# Patient Record
Sex: FEMALE | Race: WHITE | NOT HISPANIC OR LATINO | URBAN - METROPOLITAN AREA
[De-identification: names, ages, dates, MRNs, and addresses within clinical notes are randomized per-mention and may not be internally consistent; named-entity substitution may affect disease eponyms.]

---

## 2020-05-19 ENCOUNTER — TELEMEDICINE (OUTPATIENT)
Dept: GASTROENTEROLOGY | Facility: CLINIC | Age: 55
End: 2020-05-19

## 2020-05-19 DIAGNOSIS — Z12.11 ENCOUNTER FOR SCREENING FOR MALIGNANT NEOPLASM OF COLON: ICD-10-CM

## 2020-05-19 DIAGNOSIS — R79.89 LFTS ABNORMAL: Primary | ICD-10-CM

## 2020-05-19 PROCEDURE — 99203 OFFICE O/P NEW LOW 30 MIN: CPT | Performed by: INTERNAL MEDICINE

## 2020-05-19 NOTE — PROGRESS NOTES
"Video visit:  You have chosen to receive care through a telehealth visit.  Do you consent to use a video/audio connection for your medical care today? Yes    PATIENT INFORMATION  Amaya Palm       - 1965    CHIEF COMPLAINT  Chief Complaint   Patient presents with   • Elevated Hepatic Enzymes       HISTORY OF PRESENT ILLNESS  Last year was first time with LFTs. Does have family history of Smith  5'4\" and 164 puonds   Wants it to be Alcohol and was drinking heavily last Summer      Was taking essential oil supplements and stopped them at least 5 + months ago and is only takin CBD oil.    Will repeat and complete her serology w/u but with her family history of SMITH will check a fibrosure too                REVIEW OF SYSTEMS  Review of Systems      ACTIVE PROBLEMS  There are no active problems to display for this patient.        PAST MEDICAL HISTORY  History reviewed. No pertinent past medical history.      SURGICAL HISTORY  Past Surgical History:   Procedure Laterality Date   • COLONOSCOPY      OVER 10 YRS AGO   • HYSTERECTOMY     • SEPTOPLASTY           FAMILY HISTORY  Family History   Problem Relation Age of Onset   • Colon cancer Neg Hx    • Colon polyps Neg Hx          SOCIAL HISTORY  Social History     Occupational History   • Not on file   Tobacco Use   • Smoking status: Never Smoker   • Smokeless tobacco: Never Used   Substance and Sexual Activity   • Alcohol use: Yes   • Drug use: Never   • Sexual activity: Not on file         CURRENT MEDICATIONS    Current Outpatient Medications:   •  penciclovir (DENAVIR) 1 % cream, Apply  topically to the appropriate area as directed Every 2 (Two) Hours., Disp: , Rfl:   •  valACYclovir (VALTREX) 500 MG tablet, Take 500 mg by mouth 2 (Two) Times a Day., Disp: , Rfl:     ALLERGIES  Imiquimod    VITALS  There were no vitals filed for this visit.    LAST RESULTS   No results found for any previous visit.     No results found.    PHYSICAL EXAM  Debilities/Disabilities " Identified: None  Emotional Behavior: Appropriate  Physical Exam    ASSESSMENT  Diagnoses and all orders for this visit:    LFTs abnormal  -     Mitochondrial Antibodies, M2  -     Alpha - 1 - Antitrypsin Deficiency; Future  -     Immunoglobulins Quant; Future  -     GATICA Fibrosure  -     STEPHAN  -     Anti-smooth muscle antibody titer; Future    Encounter for screening for malignant neoplasm of colon          PLAN  Will call with serology but didn't discuss CRC screening this visit but no indication she has had her colonoscopy  Return in about 2 months (around 7/19/2020).    I have discussed the above plan with the patient.  They verbalize understanding and are in agreement with the plan.  They have been advised to contact the office for any questions, concerns, or changes related to their health.

## 2021-10-28 ENCOUNTER — OFFICE VISIT (OUTPATIENT)
Dept: ENT CLINIC | Age: 56
End: 2021-10-28
Payer: COMMERCIAL

## 2021-10-28 VITALS
SYSTOLIC BLOOD PRESSURE: 147 MMHG | TEMPERATURE: 96.6 F | HEIGHT: 64 IN | HEART RATE: 86 BPM | DIASTOLIC BLOOD PRESSURE: 81 MMHG | WEIGHT: 184.4 LBS | BODY MASS INDEX: 31.48 KG/M2

## 2021-10-28 DIAGNOSIS — M54.12 CERVICAL RADICULOPATHY: ICD-10-CM

## 2021-10-28 DIAGNOSIS — H60.8X3 CHRONIC ECZEMATOUS OTITIS EXTERNA OF BOTH EARS: Primary | ICD-10-CM

## 2021-10-28 DIAGNOSIS — M48.02 CERVICAL STENOSIS OF SPINE: ICD-10-CM

## 2021-10-28 PROCEDURE — 99204 OFFICE O/P NEW MOD 45 MIN: CPT | Performed by: OTOLARYNGOLOGY

## 2021-10-28 ASSESSMENT — ENCOUNTER SYMPTOMS
COUGH: 0
EYE REDNESS: 0
EYE ITCHING: 0
SINUS PRESSURE: 0
VOICE CHANGE: 0
CHOKING: 0
EYE PAIN: 0
SHORTNESS OF BREATH: 0
NAUSEA: 0
DIARRHEA: 0
FACIAL SWELLING: 0
TROUBLE SWALLOWING: 0
RHINORRHEA: 0
SINUS PAIN: 0
SORE THROAT: 0

## 2021-10-28 NOTE — LETTER
[facility]  [address]  Phone: [phone]  Fax: [phone] Cira Desir MD      October 28, 2021     Patient: Jessy Coleman   MR Number: <[id number]>   YOB: 1965   Date of Visit: 10/28/2021       Dear Dr. Ovidio Self ref. provider found: Thank you for referring Jessy Coleman to me for evaluation/treatment. Below are the relevant portions of my assessment and plan of care. Diagnosis Orders   1. Chronic eczematous otitis externa of both ears     2. Cervical radiculopathy     3. Cervical stenosis of spine           OR for ACDF approach, 2 levels. The patient has a diagnosis of cervical stenosis which has not been responsive or cannot be treated with maximal medical therapy. The patient has been advised of options including further medical therapy, surgery, or nothing. The risks and benefits of surgery were described not limited to infection, bleeding, airway and pharyngeal fistula, scars including hypertrophic and keloids, recurrence of disease  and need for further surgical management of disease. Patient expectations during and after surgery including post-operative care and pain control were described. Questions were answered and the patient agreed to proceed with surgery which will be scheduled in an appropriate time frame in line with the severity of disease. The patient was counseled at length about the risks of perez Covid-19 in the cong-operative and post-operative states including the recovery window of their procedure. The patient was made aware that perez Covid-19 after a surgical procedure may worsen their prognosis for recovering from the virus and lend to a higher morbidity and or mortality risk. The patient was given the options of postponing their procedure. All of the risks, benefits, and alternatives were discussed. The patient does wish to proceed with the procedure.     Stop cleaining ears.              If you have questions, please do not hesitate to call me. I look forward to following Kristin Galvan along with you. Sincerely,        Salud Valerio MD    CC providers:   Sariah Dixon, [address]  [place]

## 2021-10-28 NOTE — LETTER
[facility]    Surgery Schedule Request Form: 10/28/21  [address] [place], [address]      DATE OF SURGERY:     TIME OF SURGERY:              CONF #: ____________________       Patient Information:    Patient name: Yesy Plaza    YOB: 1965 Age/Sex:56 y.o./female    SS #:(Not on file)    Wt Readings from Last 1 Encounters:   No data found for [place]       Telephone Information:   Mobile [phone]     Home [phone]     Surgeon & Procedure Information:     Lead surgeon: Mary Day Co-Surgeon: yes - Dr. Mary Rocha  Phone: [phone] Fax: [phone]  PCP: No primary care provider on file. Diagnosis: cervical stenosis, cervical radiculopathy    Procedure name/CPT: Anterior cervical decompression with fixation. C4-5, C6-7, 32997 1 unit and 46697 I unit    Procedure length: Per Dr. Mary Rohca Anesthesia: General    Special Equipment: no    Patient Status: Per Dr. Mary Rocha    Primary Payor Plan:   Member ID:    Subscriber name:     [] Implement attached clinical orders for patient.       Electronically signed by Сергей Craven MD on 10/28/2021 at 2:47 PM

## 2021-10-28 NOTE — PROGRESS NOTES
Subjective:      Patient ID: Lottie Esqueda is a 61 y.o. female. HPI  Voice Disorders  CC: voice disorder  Morenita Ha is a(n) 61 y.o. female who presents with a history of C-spine surgery. Here for revision surgery and vocal fold check. No problems with voice or swallowing from first ACDF. Left sided approach. Also some right ear discomfort. No hearing problems. Cleans ears a lot. There is no problem list on file for this patient. No past surgical history on file. No family history on file. Social History     Socioeconomic History    Marital status: Not on file     Spouse name: Not on file    Number of children: Not on file    Years of education: Not on file    Highest education level: Not on file   Occupational History    Not on file   Tobacco Use    Smoking status: Not on file   Substance and Sexual Activity    Alcohol use: Not on file    Drug use: Not on file    Sexual activity: Not on file   Other Topics Concern    Not on file   Social History Narrative    Not on file     Social Determinants of Health     Financial Resource Strain:     Difficulty of Paying Living Expenses:    Food Insecurity:     Worried About Running Out of Food in the Last Year:     [address] in the Last Year:    Transportation Needs:     Lack of Transportation (Medical):      Lack of Transportation (Non-Medical):    Physical Activity:     Days of Exercise per Week:     Minutes of Exercise per Session:    Stress:     Feeling of Stress :    Social Connections:     Frequency of Communication with Friends and Family:     Frequency of Social Gatherings with Friends and Family:     Attends Muslim Services:     Active Member of Clubs or Organizations:     Attends Club or Organization Meetings:     Marital Status:    Intimate Partner Violence:     Fear of Current or Ex-Partner:     Emotionally Abused:     Physically Abused:     Sexually Abused:        DRUG/FOOD ALLERGIES: Patient has no allergy information on record. CURRENT MEDICATIONS  Prior to Admission medications    Not on File       Lab Studies:No results found for: WBC, HGB, HCT, MCV, PLT  No results found for: GLUCOSE, BUN, CREATININE, K, NA, CL, CALCIUM  No results found for: MG  No results found for: PHOS  No results found for: ALKPHOS, ALT, AST, BILITOT, ALBUMIN, PROT, LABGLOB          Review of Systems   Constitutional: Negative for activity change, appetite change, chills, fatigue and fever. HENT: Negative for congestion, ear discharge, ear pain, facial swelling, hearing loss, nosebleeds, postnasal drip, rhinorrhea, sinus pressure, sinus pain, sneezing, sore throat, tinnitus, trouble swallowing and voice change. Eyes: Negative for pain, redness, itching and visual disturbance. Respiratory: Negative for cough, choking and shortness of breath. Gastrointestinal: Negative for diarrhea and nausea. Endocrine: Negative for cold intolerance and heat intolerance. Objective:   Physical Exam  Constitutional:       General: She is not in acute distress. Appearance: She is well-developed. HENT:      Head: Not macrocephalic and not microcephalic. No Velez's sign, abrasion, contusion, right periorbital erythema, left periorbital erythema or laceration. Right Ear: Hearing, tympanic membrane, ear canal and external ear normal. No decreased hearing noted. No laceration, drainage, swelling or tenderness. No middle ear effusion. No foreign body. No mastoid tenderness. No hemotympanum. Tympanic membrane is not injected, perforated, retracted or bulging. Tympanic membrane has normal mobility. Left Ear: Hearing, tympanic membrane, ear canal and external ear normal. No decreased hearing noted. No laceration, drainage, swelling or tenderness. No middle ear effusion. No foreign body. No mastoid tenderness. No hemotympanum. Tympanic membrane is not injected, perforated, retracted or bulging. Tympanic membrane has normal mobility.       Ears: Rosas exam findings: does not lateralize. Right Rinne: AC > BC. Left Rinne: AC > BC. Nose: No mucosal edema or rhinorrhea. Mouth/Throat:      Lips: No lesions. Mouth: No oral lesions. Dentition: Normal dentition. Tongue: No lesions. Palate: No mass. Pharynx: No oropharyngeal exudate, posterior oropharyngeal erythema or uvula swelling. Tonsils: No tonsillar abscesses. Eyes:      Extraocular Movements:      Right eye: Normal extraocular motion and no nystagmus. Left eye: Normal extraocular motion and no nystagmus. Pupils:      Right eye: Pupil is reactive. Left eye: Pupil is reactive. Neck:      Thyroid: No thyroid mass or thyromegaly. Trachea: No tracheal tenderness or tracheal deviation. Cardiovascular:      Rate and Rhythm: Normal rate and regular rhythm. Pulmonary:      Breath sounds: No stridor. Musculoskeletal:      Cervical back: Normal range of motion and neck supple. No edema or erythema. No muscular tenderness. Lymphadenopathy:      Head:      Right side of head: No submental, submandibular, tonsillar, preauricular, posterior auricular or occipital adenopathy. Left side of head: No submental, submandibular, tonsillar, preauricular, posterior auricular or occipital adenopathy. Cervical: No cervical adenopathy. Right cervical: No superficial, deep or posterior cervical adenopathy. Left cervical: No superficial, deep or posterior cervical adenopathy. Skin:     General: Skin is warm and dry. Findings: No bruising, erythema or lesion. Neurological:      Mental Status: She is alert and oriented to person, place, and time. Psychiatric:         Attention and Perception: Attention normal.         Mood and Affect: Mood normal. Mood is not anxious or depressed. Speech: Speech normal.         Assessment:       Diagnosis Orders   1. Chronic eczematous otitis externa of both ears     2.  Cervical radiculopathy     3. Cervical stenosis of spine             Plan:      OR for ACDF approach, 2 levels. The patient has a diagnosis of cervical stenosis which has not been responsive or cannot be treated with maximal medical therapy. The patient has been advised of options including further medical therapy, surgery, or nothing. The risks and benefits of surgery were described not limited to infection, bleeding, airway and pharyngeal fistula, scars including hypertrophic and keloids, recurrence of disease  and need for further surgical management of disease. Patient expectations during and after surgery including post-operative care and pain control were described. Questions were answered and the patient agreed to proceed with surgery which will be scheduled in an appropriate time frame in line with the severity of disease. The patient was counseled at length about the risks of perez Covid-19 in the cong-operative and post-operative states including the recovery window of their procedure. The patient was made aware that perez Covid-19 after a surgical procedure may worsen their prognosis for recovering from the virus and lend to a higher morbidity and or mortality risk. The patient was given the options of postponing their procedure. All of the risks, benefits, and alternatives were discussed. The patient does wish to proceed with the procedure. Stop cleaining ears.                   Rc Meek MD

## 2021-12-17 RX ORDER — MULTIVIT WITH MINERALS/LUTEIN
250 TABLET ORAL DAILY
Status: ON HOLD | COMMUNITY
End: 2021-12-31 | Stop reason: HOSPADM

## 2021-12-17 RX ORDER — BETA-CAROTENE 7500 MCG
CAPSULE ORAL
Status: ON HOLD | COMMUNITY
End: 2021-12-31 | Stop reason: HOSPADM

## 2021-12-17 NOTE — PROGRESS NOTES
[facility] patients having surgery or anesthesia are required to be Covid tested OR to have been vaccinated at least 14 days prior to your procedure. It is very important to return our call to [phone] and notify the staff of your last vaccination date otherwise you will be required to complete Covid PCR test within the 5-6 days prior to surgery & quarantine. The results will need to be faxed to PreAdmission Testing at [phone]. PRIOR TO PROCEDURE DATE:        1. PLEASE FOLLOW ANY  GUIDELINES/ INSTRUCTIONS PRIOR TO YOUR PROCEDURE AS ADVISED BY YOUR SURGEON. 2. Arrange for someone to drive you home and be with you for the first 24 hours after discharge for your safety after your procedure for which you received sedation. Ensure it is someone we can share information with regarding your discharge. 3. You must contact your surgeon for instructions IF:   You are taking any blood thinners, aspirin, anti-inflammatory or vitamin E.   There is a change in your physical condition such as a cold, fever, rash, cuts, sores or any other infection, especially near your surgical site. 4. Do not drink alcohol the day before or day of your procedure. 5. A Pre-op History and Physical for surgery MUST be completed by your Physician or Urgent Care within 30 days of your procedure date. Please bring a copy with you on the day of your procedure and along with any other testing performed. THE DAY OF YOUR PROCEDURE:  1. Follow instructions for ARRIVAL TIME as DIRECTED BY YOUR SURGEON. 2. Enter the MAIN entrance from [facility] and follow the signs to the free [employer] or [employer] parking (offered free of charge 6am-5pm). 3. Enter the Main Entrance of the hospital (do not enter from the lower level of the parking garage). Upon entrance, check in with the  at the main desk on your left. while you are in surgery. 10. If you use a CPAP, please bring it with you on the day of your procedure. 11. We recommend that valuable personal  belongings such as cash, cell phones, e-tablets or jewelry, be left at home during your stay. The hospital will not be responsible for valuables that are not secured in the hospital safe. However, if your insurance requires a co-pay, you may want to bring a method of payment, i.e. Check or credit card, if you wish to pay your co-pay the day of surgery. 12. If you are to stay overnight, you may bring a bag with personal items. Please have any large items you may need brought in by your family after your arrival to your hospital room. 15. If you have a Living Will or Durable Power of , please bring a copy on the day of your procedure. 15. With your permission, one family member may accompany you while you are being prepared for surgery. Once you are ready, additional family members may join you. HOW WE KEEP YOU SAFE and WORK TO PREVENT SURGICAL SITE INFECTIONS:  1. Health care workers should always check your ID bracelet to verify your name and birth date. You will be asked many times to state your name, date of birth, and allergies. 2. Health care workers should always clean their hands with soap or alcohol gel before providing care to you. It is okay to ask anyone if they cleaned their hands before they touch you. 3. You will be actively involved in verifying the type of procedure you are having and ensuring the correct surgical site. This will be confirmed multiple times prior to your procedure. Do NOT saad your surgery site UNLESS instructed to by your surgeon. 4. Do not shave or wax for 72 hours prior to procedure near your operative site. Shaving with a razor can irritate your skin and make it easier to develop an infection.  On the day of your procedure, any hair that needs to be removed near the surgical site will be clipped by a healthcare worker using a special clippers designed to avoid skin irritation. 5. When you are in the operating room, your surgical site will be cleansed with a special soap, and in most cases, you will be given an antibiotic before the surgery begins. What to expect AFTER YOUR PROCEDURE:  1. Immediately following your procedure, your will be taken to the PACU for the first phase of your recovery. Your nurse will help you recover from any potential side effects of anesthesia, such as extreme drowsiness, changes in your vital signs or breathing patterns. Nausea, headache, muscle aches, or sore throat may also occur after anesthesia. Your nurse will help you manage these potential side effects. 2. For comfort and safety, arrange to have someone at home with you for the first 24 hours after discharge. 3. You and your family will be given written instructions about your diet, activity, dressing care, medications, and return visits. 4. Once at home, should issues with nausea, pain, or bleeding occur, or should you notice any signs of infection, you should call your surgeon. 5. Always clean your hands before and after caring for your wound. Do not let your family touch your surgery site without cleaning their hands. 6. Narcotic pain medications can cause significant constipation. You may want to add a stool softener to your postoperative medication schedule or speak to your surgeon on how best to manage this SIDE EFFECT. SPECIAL INSTRUCTIONS     Thank you for allowing us to care for you. We strive to exceed your expectations in the delivery of care and service provided to you and your family. If you need to contact the [place] [address] staff for any reason, please call us at [phone]    Instructions reviewed with patient during preadmission testing phone interview.   Chance Livingston RN.12/17/2021 .1:18 PM      ADDITIONAL EDUCATIONAL INFORMATION REVIEWED PER PHONE WITH YOU AND/OR YOUR FAMILY:  Yes Hibiclens® Bathing Instructions   No Antibacterial Soap

## 2021-12-17 NOTE — PROGRESS NOTES
Place patient label inside box (if no patient label, complete below)  Name:  :  MR#:   Mylene Pérez / PROCEDURE  1. I (we), Rodrigo Bee (Patient Name) authorize Finley Cheadle (Provider / Amarilis Driver) and/or such assistants as may be selected by him/her, to perform the following operation/procedure(s): C4-5, C5-6, C6-7 ANTERIOR CERVICAL DISCECTOMY AND FUSION, REMOVAL OF C5-6 PLATE       Note: If unable to obtain consent prior to an emergent procedure, document the emergent reason in the medical record. This procedure has been explained to my (our) satisfaction and included in the explanation was:  A) The intended benefit, nature, and extent of the procedure to be performed;  B) The significant risks involved and the probability of success;  C) Alternative procedures and methods of treatment;  D) The dangers and probable consequences of such alternatives (including no procedure or treatment); E) The expected consequences of the procedure on my future health;  F) Whether other qualified individuals would be performing important surgical tasks and/or whether  would be present to advise or support the procedure. I (we) understand that there are other risks of infection and other serious complications in the pre-operative/procedural and postoperative/procedural stages of my (our) care. I (we) have asked all of the questions which I (we) thought were important in deciding whether or not to undergo treatment or diagnosis. These questions have been answered to my (our) satisfaction. I (we) understand that no assurance can be given that the procedure will be a success, and no guarantee or warranty of success has been given to me (us).     2. It has been explained to me (us) that during the course of the operation/procedure, unforeseen conditions may be revealed that necessitate extension of the original procedure(s) or different procedure(s) than those set forth in Paragraph 1. I (we) authorize and request that the above-named physician, his/her assistants or his/her designees, perform procedures as necessary and desirable if deemed to be in my (our) best interest.     Revised 8/2/2021                                                                          Page 1 of 2         3. I acknowledge that health care personnel may be observing this procedure for the purpose of medical education or other specified purposes as may be necessary as requested and/or approved by my (our) physician. 4. I (we) consent to the disposal by the hospital Pathologist of the removed tissue, parts or organs in accordance with hospital policy. 5. I do ____ do not ____ consent to the use of a local infiltration pain blocking agent that will be used by my provider/surgical provider to help alleviate pain during my procedure. 6. I do ____ do not ____ consent to an emergent blood transfusion in the case of a life-threatening situation that requires blood components to be administered. This consent is valid for 24 hours from the beginning of the procedure. 7. This patient does ____ or does not ____ currently have a DNR status/order. If DNR order is in place, obtain Addendum to the Surgical Consent for ALL Patients with a DNR Order to address cong-operative status for limited intervention or DNR suspension.      8. I have read and fully understand the above Consent for Operation/Procedure and that all blanks were completed before I signed the consent.   _____________________________       _____________________      ____/____am/pm  Signature of Patient or legal representative      Printed Name / Relationship            Date / Time   ____________________________       _____________________      ____/____am/pm  Witness to Signature                                    Printed Name                    Date / Time     If patient is unable to sign or is a minor, complete the following)  Patient is a minor, ____ years of age, or unable to sign because:   ______________________________________________________________________________________________    Allan Layer If a phone consent is obtained, consent will be documented by using two health care professionals, each affirming that the consenting party has no questions and gives consent for the procedure discussed with the physician/provider.   _____________________          ____________________       _____/_____am/pm   2nd witness to phone consent        Printed name           Date / Time    Informed Consent:  I have provided the explanation described above in section 1 to the patient and/or legal representative.  I have provided the patient and/or legal representative with an opportunity to ask any questions about the proposed operation/procedure.   ___________________________          ____________________         ____/____am/pm  Provider / Proceduralist                            Printed name            Date / Time  Revised 8/2/2021                                                                      Page 2 of 2

## 2021-12-28 ENCOUNTER — ANESTHESIA EVENT (OUTPATIENT)
Dept: OPERATING ROOM | Age: 56
End: 2021-12-28
Payer: COMMERCIAL

## 2021-12-29 ENCOUNTER — APPOINTMENT (OUTPATIENT)
Dept: GENERAL RADIOLOGY | Age: 56
End: 2021-12-29
Attending: NEUROLOGICAL SURGERY
Payer: COMMERCIAL

## 2021-12-29 ENCOUNTER — HOSPITAL ENCOUNTER (OUTPATIENT)
Age: 56
Setting detail: OBSERVATION
Discharge: HOME | End: 2021-12-31
Attending: NEUROLOGICAL SURGERY | Admitting: NEUROLOGICAL SURGERY
Payer: COMMERCIAL

## 2021-12-29 ENCOUNTER — ANESTHESIA (OUTPATIENT)
Dept: OPERATING ROOM | Age: 56
End: 2021-12-29
Payer: COMMERCIAL

## 2021-12-29 VITALS — DIASTOLIC BLOOD PRESSURE: 63 MMHG | SYSTOLIC BLOOD PRESSURE: 124 MMHG | OXYGEN SATURATION: 96 % | TEMPERATURE: 99.3 F

## 2021-12-29 DIAGNOSIS — M48.02 CERVICAL SPINAL STENOSIS: ICD-10-CM

## 2021-12-29 DIAGNOSIS — M47.22 CERVICAL SPONDYLOSIS WITH RADICULOPATHY: Primary | ICD-10-CM

## 2021-12-29 LAB
ABO/RH: NORMAL
ANTIBODY SCREEN: NORMAL

## 2021-12-29 PROCEDURE — 7100000000 HC PACU RECOVERY - FIRST 15 MIN: Performed by: NEUROLOGICAL SURGERY

## 2021-12-29 PROCEDURE — 3600000014 HC SURGERY LEVEL 4 ADDTL 15MIN: Performed by: NEUROLOGICAL SURGERY

## 2021-12-29 PROCEDURE — 2780000010 HC IMPLANT OTHER: Performed by: NEUROLOGICAL SURGERY

## 2021-12-29 PROCEDURE — 2580000003 HC RX 258: Performed by: ANESTHESIOLOGY

## 2021-12-29 PROCEDURE — 2500000003 HC RX 250 WO HCPCS: Performed by: NURSE ANESTHETIST, CERTIFIED REGISTERED

## 2021-12-29 PROCEDURE — 3209999900 FLUORO FOR SURGICAL PROCEDURES

## 2021-12-29 PROCEDURE — 2709999900 HC NON-CHARGEABLE SUPPLY: Performed by: NEUROLOGICAL SURGERY

## 2021-12-29 PROCEDURE — 6360000002 HC RX W HCPCS: Performed by: NURSE ANESTHETIST, CERTIFIED REGISTERED

## 2021-12-29 PROCEDURE — 6370000000 HC RX 637 (ALT 250 FOR IP): Performed by: NEUROLOGICAL SURGERY

## 2021-12-29 PROCEDURE — 6360000002 HC RX W HCPCS: Performed by: NEUROLOGICAL SURGERY

## 2021-12-29 PROCEDURE — 86901 BLOOD TYPING SEROLOGIC RH(D): CPT

## 2021-12-29 PROCEDURE — 6360000002 HC RX W HCPCS: Performed by: ANESTHESIOLOGY

## 2021-12-29 PROCEDURE — 7100000001 HC PACU RECOVERY - ADDTL 15 MIN: Performed by: NEUROLOGICAL SURGERY

## 2021-12-29 PROCEDURE — 22551 ARTHRD ANT NTRBDY CERVICAL: CPT | Performed by: OTOLARYNGOLOGY

## 2021-12-29 PROCEDURE — 3700000001 HC ADD 15 MINUTES (ANESTHESIA): Performed by: NEUROLOGICAL SURGERY

## 2021-12-29 PROCEDURE — 22552 ARTHRD ANT NTRBD CERVICAL EA: CPT | Performed by: OTOLARYNGOLOGY

## 2021-12-29 PROCEDURE — 2580000003 HC RX 258: Performed by: NEUROLOGICAL SURGERY

## 2021-12-29 PROCEDURE — 3600000004 HC SURGERY LEVEL 4 BASE: Performed by: NEUROLOGICAL SURGERY

## 2021-12-29 PROCEDURE — 2720000010 HC SURG SUPPLY STERILE: Performed by: NEUROLOGICAL SURGERY

## 2021-12-29 PROCEDURE — C1729 CATH, DRAINAGE: HCPCS | Performed by: NEUROLOGICAL SURGERY

## 2021-12-29 PROCEDURE — C1713 ANCHOR/SCREW BN/BN,TIS/BN: HCPCS | Performed by: NEUROLOGICAL SURGERY

## 2021-12-29 PROCEDURE — 2580000003 HC RX 258: Performed by: NURSE ANESTHETIST, CERTIFIED REGISTERED

## 2021-12-29 PROCEDURE — G0378 HOSPITAL OBSERVATION PER HR: HCPCS

## 2021-12-29 PROCEDURE — 72040 X-RAY EXAM NECK SPINE 2-3 VW: CPT

## 2021-12-29 PROCEDURE — 86850 RBC ANTIBODY SCREEN: CPT

## 2021-12-29 PROCEDURE — 3700000000 HC ANESTHESIA ATTENDED CARE: Performed by: NEUROLOGICAL SURGERY

## 2021-12-29 PROCEDURE — 86900 BLOOD TYPING SEROLOGIC ABO: CPT

## 2021-12-29 DEVICE — IMPLANTABLE DEVICE
Type: IMPLANTABLE DEVICE | Site: SPINE CERVICAL | Status: FUNCTIONAL
Brand: TRINICA®

## 2021-12-29 DEVICE — IMPLANTABLE DEVICE
Type: IMPLANTABLE DEVICE | Site: SPINE CERVICAL | Status: FUNCTIONAL
Brand: TRINICA® TRINICA®

## 2021-12-29 DEVICE — IMPLANTABLE DEVICE
Type: IMPLANTABLE DEVICE | Site: SPINE CERVICAL | Status: FUNCTIONAL
Brand: VISTA®-S

## 2021-12-29 DEVICE — PRIMAGEN 1CC: Type: IMPLANTABLE DEVICE | Site: SPINE CERVICAL | Status: FUNCTIONAL

## 2021-12-29 RX ORDER — OXYCODONE HYDROCHLORIDE AND ACETAMINOPHEN 5; 325 MG/1; MG/1
2 TABLET ORAL PRN
Status: DISCONTINUED | OUTPATIENT
Start: 2021-12-29 | End: 2021-12-29 | Stop reason: HOSPADM

## 2021-12-29 RX ORDER — REMIFENTANIL HYDROCHLORIDE 1 MG/ML
INJECTION, POWDER, LYOPHILIZED, FOR SOLUTION INTRAVENOUS CONTINUOUS PRN
Status: DISCONTINUED | OUTPATIENT
Start: 2021-12-29 | End: 2021-12-29 | Stop reason: SDUPTHER

## 2021-12-29 RX ORDER — SUCCINYLCHOLINE/SOD CL,ISO/PF 200MG/10ML
SYRINGE (ML) INTRAVENOUS PRN
Status: DISCONTINUED | OUTPATIENT
Start: 2021-12-29 | End: 2021-12-29 | Stop reason: SDUPTHER

## 2021-12-29 RX ORDER — ONDANSETRON 2 MG/ML
4 INJECTION INTRAMUSCULAR; INTRAVENOUS EVERY 6 HOURS PRN
Status: DISCONTINUED | OUTPATIENT
Start: 2021-12-29 | End: 2021-12-31 | Stop reason: HOSPADM

## 2021-12-29 RX ORDER — OXYCODONE HYDROCHLORIDE 5 MG/1
5 TABLET ORAL EVERY 4 HOURS PRN
Status: DISCONTINUED | OUTPATIENT
Start: 2021-12-29 | End: 2021-12-31 | Stop reason: HOSPADM

## 2021-12-29 RX ORDER — POLYETHYLENE GLYCOL 3350 17 G/17G
17 POWDER, FOR SOLUTION ORAL DAILY
Status: DISCONTINUED | OUTPATIENT
Start: 2021-12-29 | End: 2021-12-31 | Stop reason: HOSPADM

## 2021-12-29 RX ORDER — ACETAMINOPHEN 325 MG/1
650 TABLET ORAL EVERY 4 HOURS PRN
Status: DISCONTINUED | OUTPATIENT
Start: 2021-12-29 | End: 2021-12-31 | Stop reason: HOSPADM

## 2021-12-29 RX ORDER — PROPOFOL 10 MG/ML
INJECTION, EMULSION INTRAVENOUS PRN
Status: DISCONTINUED | OUTPATIENT
Start: 2021-12-29 | End: 2021-12-29 | Stop reason: SDUPTHER

## 2021-12-29 RX ORDER — SODIUM CHLORIDE, SODIUM LACTATE, POTASSIUM CHLORIDE, CALCIUM CHLORIDE 600; 310; 30; 20 MG/100ML; MG/100ML; MG/100ML; MG/100ML
INJECTION, SOLUTION INTRAVENOUS CONTINUOUS
Status: DISCONTINUED | OUTPATIENT
Start: 2021-12-29 | End: 2021-12-29

## 2021-12-29 RX ORDER — OXYCODONE HYDROCHLORIDE 5 MG/1
10 TABLET ORAL EVERY 4 HOURS PRN
Status: DISCONTINUED | OUTPATIENT
Start: 2021-12-29 | End: 2021-12-31 | Stop reason: HOSPADM

## 2021-12-29 RX ORDER — SODIUM CHLORIDE 0.9 % (FLUSH) 0.9 %
10 SYRINGE (ML) INJECTION EVERY 12 HOURS SCHEDULED
Status: DISCONTINUED | OUTPATIENT
Start: 2021-12-29 | End: 2021-12-31 | Stop reason: HOSPADM

## 2021-12-29 RX ORDER — DEXAMETHASONE SODIUM PHOSPHATE 4 MG/ML
INJECTION, SOLUTION INTRA-ARTICULAR; INTRALESIONAL; INTRAMUSCULAR; INTRAVENOUS; SOFT TISSUE PRN
Status: DISCONTINUED | OUTPATIENT
Start: 2021-12-29 | End: 2021-12-29 | Stop reason: SDUPTHER

## 2021-12-29 RX ORDER — HYDRALAZINE HYDROCHLORIDE 20 MG/ML
5 INJECTION INTRAMUSCULAR; INTRAVENOUS EVERY 10 MIN PRN
Status: DISCONTINUED | OUTPATIENT
Start: 2021-12-29 | End: 2021-12-29 | Stop reason: HOSPADM

## 2021-12-29 RX ORDER — MAGNESIUM HYDROXIDE 1200 MG/15ML
LIQUID ORAL CONTINUOUS PRN
Status: COMPLETED | OUTPATIENT
Start: 2021-12-29 | End: 2021-12-29

## 2021-12-29 RX ORDER — FENTANYL CITRATE 50 UG/ML
50 INJECTION, SOLUTION INTRAMUSCULAR; INTRAVENOUS EVERY 5 MIN PRN
Status: DISCONTINUED | OUTPATIENT
Start: 2021-12-29 | End: 2021-12-29 | Stop reason: HOSPADM

## 2021-12-29 RX ORDER — FENTANYL CITRATE 50 UG/ML
25 INJECTION, SOLUTION INTRAMUSCULAR; INTRAVENOUS EVERY 5 MIN PRN
Status: DISCONTINUED | OUTPATIENT
Start: 2021-12-29 | End: 2021-12-29 | Stop reason: HOSPADM

## 2021-12-29 RX ORDER — HYDROMORPHONE HCL 110MG/55ML
PATIENT CONTROLLED ANALGESIA SYRINGE INTRAVENOUS PRN
Status: DISCONTINUED | OUTPATIENT
Start: 2021-12-29 | End: 2021-12-29 | Stop reason: SDUPTHER

## 2021-12-29 RX ORDER — SODIUM CHLORIDE 9 MG/ML
25 INJECTION, SOLUTION INTRAVENOUS PRN
Status: DISCONTINUED | OUTPATIENT
Start: 2021-12-29 | End: 2021-12-31 | Stop reason: HOSPADM

## 2021-12-29 RX ORDER — ONDANSETRON 2 MG/ML
INJECTION INTRAMUSCULAR; INTRAVENOUS PRN
Status: DISCONTINUED | OUTPATIENT
Start: 2021-12-29 | End: 2021-12-29 | Stop reason: SDUPTHER

## 2021-12-29 RX ORDER — MAGNESIUM HYDROXIDE/ALUMINUM HYDROXICE/SIMETHICONE 120; 1200; 1200 MG/30ML; MG/30ML; MG/30ML
15 SUSPENSION ORAL EVERY 6 HOURS PRN
Status: DISCONTINUED | OUTPATIENT
Start: 2021-12-29 | End: 2021-12-31 | Stop reason: HOSPADM

## 2021-12-29 RX ORDER — SENNA AND DOCUSATE SODIUM 50; 8.6 MG/1; MG/1
1 TABLET, FILM COATED ORAL 2 TIMES DAILY
Status: DISCONTINUED | OUTPATIENT
Start: 2021-12-29 | End: 2021-12-31 | Stop reason: HOSPADM

## 2021-12-29 RX ORDER — PROMETHAZINE HYDROCHLORIDE 12.5 MG/1
12.5 TABLET ORAL EVERY 6 HOURS PRN
Status: DISCONTINUED | OUTPATIENT
Start: 2021-12-29 | End: 2021-12-31 | Stop reason: HOSPADM

## 2021-12-29 RX ORDER — SODIUM CHLORIDE 9 MG/ML
INJECTION, SOLUTION INTRAVENOUS CONTINUOUS
Status: DISCONTINUED | OUTPATIENT
Start: 2021-12-29 | End: 2021-12-31 | Stop reason: HOSPADM

## 2021-12-29 RX ORDER — LABETALOL HYDROCHLORIDE 5 MG/ML
5 INJECTION, SOLUTION INTRAVENOUS EVERY 10 MIN PRN
Status: DISCONTINUED | OUTPATIENT
Start: 2021-12-29 | End: 2021-12-29 | Stop reason: HOSPADM

## 2021-12-29 RX ORDER — BISACODYL 10 MG
10 SUPPOSITORY, RECTAL RECTAL DAILY PRN
Status: DISCONTINUED | OUTPATIENT
Start: 2021-12-29 | End: 2021-12-31 | Stop reason: HOSPADM

## 2021-12-29 RX ORDER — OXYCODONE HYDROCHLORIDE AND ACETAMINOPHEN 5; 325 MG/1; MG/1
1 TABLET ORAL PRN
Status: DISCONTINUED | OUTPATIENT
Start: 2021-12-29 | End: 2021-12-29 | Stop reason: HOSPADM

## 2021-12-29 RX ORDER — NALOXONE HYDROCHLORIDE 0.4 MG/ML
0.2 INJECTION, SOLUTION INTRAMUSCULAR; INTRAVENOUS; SUBCUTANEOUS PRN
Status: DISCONTINUED | OUTPATIENT
Start: 2021-12-29 | End: 2021-12-31 | Stop reason: HOSPADM

## 2021-12-29 RX ORDER — SODIUM CHLORIDE 0.9 % (FLUSH) 0.9 %
10 SYRINGE (ML) INJECTION PRN
Status: DISCONTINUED | OUTPATIENT
Start: 2021-12-29 | End: 2021-12-31 | Stop reason: HOSPADM

## 2021-12-29 RX ORDER — METHOCARBAMOL 750 MG/1
750 TABLET, FILM COATED ORAL EVERY 6 HOURS PRN
Status: DISCONTINUED | OUTPATIENT
Start: 2021-12-30 | End: 2021-12-31 | Stop reason: HOSPADM

## 2021-12-29 RX ORDER — PROPOFOL 10 MG/ML
INJECTION, EMULSION INTRAVENOUS CONTINUOUS PRN
Status: DISCONTINUED | OUTPATIENT
Start: 2021-12-29 | End: 2021-12-29 | Stop reason: SDUPTHER

## 2021-12-29 RX ORDER — ONDANSETRON 2 MG/ML
4 INJECTION INTRAMUSCULAR; INTRAVENOUS
Status: DISCONTINUED | OUTPATIENT
Start: 2021-12-29 | End: 2021-12-29 | Stop reason: HOSPADM

## 2021-12-29 RX ADMIN — DEXMEDETOMIDINE HYDROCHLORIDE 20 MCG: 100 INJECTION, SOLUTION INTRAVENOUS at 08:42

## 2021-12-29 RX ADMIN — PROPOFOL 20 MG: 10 INJECTION, EMULSION INTRAVENOUS at 07:53

## 2021-12-29 RX ADMIN — HYDROMORPHONE HYDROCHLORIDE 1 MG: 2 INJECTION, SOLUTION INTRAMUSCULAR; INTRAVENOUS; SUBCUTANEOUS at 11:10

## 2021-12-29 RX ADMIN — Medication 100 MG: at 07:35

## 2021-12-29 RX ADMIN — METHOCARBAMOL 1000 MG: 100 INJECTION, SOLUTION INTRAMUSCULAR; INTRAVENOUS at 20:46

## 2021-12-29 RX ADMIN — ONDANSETRON 4 MG: 2 INJECTION INTRAMUSCULAR; INTRAVENOUS at 07:44

## 2021-12-29 RX ADMIN — SODIUM CHLORIDE, POTASSIUM CHLORIDE, SODIUM LACTATE AND CALCIUM CHLORIDE: 600; 310; 30; 20 INJECTION, SOLUTION INTRAVENOUS at 06:00

## 2021-12-29 RX ADMIN — ACETAMINOPHEN 650 MG: 325 TABLET ORAL at 18:02

## 2021-12-29 RX ADMIN — PROPOFOL 20 MG: 10 INJECTION, EMULSION INTRAVENOUS at 07:38

## 2021-12-29 RX ADMIN — PROPOFOL 50 MG: 10 INJECTION, EMULSION INTRAVENOUS at 07:57

## 2021-12-29 RX ADMIN — DEXAMETHASONE SODIUM PHOSPHATE 4 MG: 4 INJECTION, SOLUTION INTRAMUSCULAR; INTRAVENOUS at 07:44

## 2021-12-29 RX ADMIN — PROPOFOL 125 MCG/KG/MIN: 10 INJECTION, EMULSION INTRAVENOUS at 07:35

## 2021-12-29 RX ADMIN — OXYCODONE HYDROCHLORIDE 10 MG: 5 TABLET ORAL at 20:46

## 2021-12-29 RX ADMIN — HYDROMORPHONE HYDROCHLORIDE 0.5 MG: 1 INJECTION, SOLUTION INTRAMUSCULAR; INTRAVENOUS; SUBCUTANEOUS at 15:10

## 2021-12-29 RX ADMIN — DEXAMETHASONE SODIUM PHOSPHATE 6 MG: 4 INJECTION, SOLUTION INTRAMUSCULAR; INTRAVENOUS at 08:18

## 2021-12-29 RX ADMIN — HYDROMORPHONE HYDROCHLORIDE 1 MG: 2 INJECTION, SOLUTION INTRAMUSCULAR; INTRAVENOUS; SUBCUTANEOUS at 07:35

## 2021-12-29 RX ADMIN — REMIFENTANIL HYDROCHLORIDE 0.15 MCG/KG/MIN: 1 INJECTION, POWDER, LYOPHILIZED, FOR SOLUTION INTRAVENOUS at 07:35

## 2021-12-29 RX ADMIN — SODIUM CHLORIDE, POTASSIUM CHLORIDE, SODIUM LACTATE AND CALCIUM CHLORIDE: 600; 310; 30; 20 INJECTION, SOLUTION INTRAVENOUS at 06:41

## 2021-12-29 RX ADMIN — SODIUM CHLORIDE, PRESERVATIVE FREE 10 ML: 5 INJECTION INTRAVENOUS at 20:46

## 2021-12-29 RX ADMIN — DEXMEDETOMIDINE HYDROCHLORIDE 20 MCG: 100 INJECTION, SOLUTION INTRAVENOUS at 07:35

## 2021-12-29 RX ADMIN — Medication 120 MG: at 07:35

## 2021-12-29 RX ADMIN — CEFAZOLIN 2000 MG: 10 INJECTION, POWDER, FOR SOLUTION INTRAVENOUS at 16:11

## 2021-12-29 RX ADMIN — SODIUM CHLORIDE: 9 INJECTION, SOLUTION INTRAVENOUS at 11:52

## 2021-12-29 RX ADMIN — DEXMEDETOMIDINE HYDROCHLORIDE 20 MCG: 100 INJECTION, SOLUTION INTRAVENOUS at 07:25

## 2021-12-29 RX ADMIN — DEXMEDETOMIDINE HYDROCHLORIDE 20 MCG: 100 INJECTION, SOLUTION INTRAVENOUS at 08:39

## 2021-12-29 RX ADMIN — HYDROMORPHONE HYDROCHLORIDE 0.5 MG: 1 INJECTION, SOLUTION INTRAMUSCULAR; INTRAVENOUS; SUBCUTANEOUS at 18:02

## 2021-12-29 RX ADMIN — PROPOFOL 150 MG: 10 INJECTION, EMULSION INTRAVENOUS at 07:35

## 2021-12-29 RX ADMIN — SODIUM CHLORIDE, POTASSIUM CHLORIDE, SODIUM LACTATE AND CALCIUM CHLORIDE: 600; 310; 30; 20 INJECTION, SOLUTION INTRAVENOUS at 10:19

## 2021-12-29 RX ADMIN — METHOCARBAMOL 1000 MG: 100 INJECTION, SOLUTION INTRAMUSCULAR; INTRAVENOUS at 11:56

## 2021-12-29 RX ADMIN — HYDROMORPHONE HYDROCHLORIDE 0.5 MG: 1 INJECTION, SOLUTION INTRAMUSCULAR; INTRAVENOUS; SUBCUTANEOUS at 12:11

## 2021-12-29 ASSESSMENT — PULMONARY FUNCTION TESTS
PIF_VALUE: 22
PIF_VALUE: 20
PIF_VALUE: 21
PIF_VALUE: 1
PIF_VALUE: 20
PIF_VALUE: 21
PIF_VALUE: 20
PIF_VALUE: 20
PIF_VALUE: 21
PIF_VALUE: 20
PIF_VALUE: 21
PIF_VALUE: 20
PIF_VALUE: 22
PIF_VALUE: 21
PIF_VALUE: 20
PIF_VALUE: 21
PIF_VALUE: 19
PIF_VALUE: 20
PIF_VALUE: 21
PIF_VALUE: 22
PIF_VALUE: 21
PIF_VALUE: 20
PIF_VALUE: 21
PIF_VALUE: 20
PIF_VALUE: 6
PIF_VALUE: 21
PIF_VALUE: 19
PIF_VALUE: 20
PIF_VALUE: 21
PIF_VALUE: 0
PIF_VALUE: 21
PIF_VALUE: 21
PIF_VALUE: 20
PIF_VALUE: 19
PIF_VALUE: 20
PIF_VALUE: 18
PIF_VALUE: 20
PIF_VALUE: 21
PIF_VALUE: 21
PIF_VALUE: 2
PIF_VALUE: 18
PIF_VALUE: 22
PIF_VALUE: 21
PIF_VALUE: 20
PIF_VALUE: 21
PIF_VALUE: 0
PIF_VALUE: 20
PIF_VALUE: 0
PIF_VALUE: 21
PIF_VALUE: 17
PIF_VALUE: 18
PIF_VALUE: 27
PIF_VALUE: 20
PIF_VALUE: 21
PIF_VALUE: 19
PIF_VALUE: 21
PIF_VALUE: 21
PIF_VALUE: 17
PIF_VALUE: 21
PIF_VALUE: 21
PIF_VALUE: 20
PIF_VALUE: 21
PIF_VALUE: 19
PIF_VALUE: 21
PIF_VALUE: 22
PIF_VALUE: 20
PIF_VALUE: 21
PIF_VALUE: 20
PIF_VALUE: 21
PIF_VALUE: 11
PIF_VALUE: 20
PIF_VALUE: 21
PIF_VALUE: 32
PIF_VALUE: 21
PIF_VALUE: 20
PIF_VALUE: 0
PIF_VALUE: 21
PIF_VALUE: 21
PIF_VALUE: 20
PIF_VALUE: 21
PIF_VALUE: 20
PIF_VALUE: 21
PIF_VALUE: 20
PIF_VALUE: 25
PIF_VALUE: 21
PIF_VALUE: 22
PIF_VALUE: 20
PIF_VALUE: 21
PIF_VALUE: 21
PIF_VALUE: 17
PIF_VALUE: 21
PIF_VALUE: 17
PIF_VALUE: 17
PIF_VALUE: 21
PIF_VALUE: 1
PIF_VALUE: 21
PIF_VALUE: 22
PIF_VALUE: 21
PIF_VALUE: 21
PIF_VALUE: 17
PIF_VALUE: 21
PIF_VALUE: 20
PIF_VALUE: 21
PIF_VALUE: 0
PIF_VALUE: 20
PIF_VALUE: 21
PIF_VALUE: 20
PIF_VALUE: 21
PIF_VALUE: 20
PIF_VALUE: 21
PIF_VALUE: 17
PIF_VALUE: 21
PIF_VALUE: 21
PIF_VALUE: 20
PIF_VALUE: 21
PIF_VALUE: 2
PIF_VALUE: 21
PIF_VALUE: 17
PIF_VALUE: 21
PIF_VALUE: 1
PIF_VALUE: 0
PIF_VALUE: 21
PIF_VALUE: 20
PIF_VALUE: 21
PIF_VALUE: 20
PIF_VALUE: 11
PIF_VALUE: 21
PIF_VALUE: 21
PIF_VALUE: 20
PIF_VALUE: 21
PIF_VALUE: 1
PIF_VALUE: 19
PIF_VALUE: 21
PIF_VALUE: 21
PIF_VALUE: 20
PIF_VALUE: 21
PIF_VALUE: 1
PIF_VALUE: 19
PIF_VALUE: 21
PIF_VALUE: 21
PIF_VALUE: 20
PIF_VALUE: 21
PIF_VALUE: 19
PIF_VALUE: 20
PIF_VALUE: 21
PIF_VALUE: 20
PIF_VALUE: 19
PIF_VALUE: 22
PIF_VALUE: 20
PIF_VALUE: 21
PIF_VALUE: 20

## 2021-12-29 ASSESSMENT — PAIN DESCRIPTION - LOCATION
LOCATION: NECK
LOCATION: SHOULDER
LOCATION: NECK
LOCATION: SHOULDER
LOCATION: NECK
LOCATION: SHOULDER

## 2021-12-29 ASSESSMENT — PAIN DESCRIPTION - PROGRESSION
CLINICAL_PROGRESSION: GRADUALLY WORSENING

## 2021-12-29 ASSESSMENT — PAIN DESCRIPTION - PAIN TYPE
TYPE: ACUTE PAIN
TYPE: ACUTE PAIN;SURGICAL PAIN

## 2021-12-29 ASSESSMENT — PAIN SCALES - GENERAL
PAINLEVEL_OUTOF10: 4
PAINLEVEL_OUTOF10: 7
PAINLEVEL_OUTOF10: 3
PAINLEVEL_OUTOF10: 7
PAINLEVEL_OUTOF10: 0
PAINLEVEL_OUTOF10: 3
PAINLEVEL_OUTOF10: 7
PAINLEVEL_OUTOF10: 3

## 2021-12-29 ASSESSMENT — PAIN DESCRIPTION - ONSET
ONSET: ON-GOING

## 2021-12-29 ASSESSMENT — PAIN DESCRIPTION - DESCRIPTORS
DESCRIPTORS: ACHING;SORE
DESCRIPTORS: THROBBING
DESCRIPTORS: ACHING;BURNING

## 2021-12-29 ASSESSMENT — PAIN DESCRIPTION - ORIENTATION
ORIENTATION: RIGHT
ORIENTATION: RIGHT
ORIENTATION: RIGHT;POSTERIOR
ORIENTATION: RIGHT
ORIENTATION: MID;POSTERIOR;DISTAL
ORIENTATION: POSTERIOR

## 2021-12-29 ASSESSMENT — PAIN DESCRIPTION - FREQUENCY
FREQUENCY: CONTINUOUS

## 2021-12-29 ASSESSMENT — PAIN DESCRIPTION - DIRECTION: RADIATING_TOWARDS: SHOULDERS

## 2021-12-29 ASSESSMENT — PAIN - FUNCTIONAL ASSESSMENT
PAIN_FUNCTIONAL_ASSESSMENT: 0-10
PAIN_FUNCTIONAL_ASSESSMENT: PREVENTS OR INTERFERES SOME ACTIVE ACTIVITIES AND ADLS

## 2021-12-29 ASSESSMENT — LIFESTYLE VARIABLES: SMOKING_STATUS: 0

## 2021-12-29 NOTE — OP NOTE
Operative Note        Patient Name: Parth Woodward  YOB: 1965  Medical Record Number:  [id number]  Billing Number:  [id number]  Date of Procedure: 12/29/2021  Time: 0730      Indications: The patient is a pleasant 61year old female with a history of cervical spine disease. She has been seen by Dr. Octavia Hammond, examined and imaging reviewed. The decision was made to go to the operating room for an ACDF. The risks, benefits, and indications for surgery were provided to the patient by Dr. Octavia Hammond and they agreed to proceed. Pre-operative Dx:  1. Anterior cervical spine disease levels C4-5, C5-6, C6-7. Post-operative Dx:  1. same       Procedure:  1. ACDF levels C4-5, C5-6, C6-7. 18452 and 84282, 2 units  Surgeon: Phil Francois Md, PhD  Circulator: Florentin Welch RN; Lizz Reynoso RN; Liz Castillo RN  Radiology Technologist: Tha Samuels  Scrub Person First: Paz Ruff  Circulator Assist: Milan Gomez  Physician Assistant: Negro Cabrales PA-C  Anesthesia:  GETA  EBL:   5ml on approach  Specimen:  None  Findings:  Non contributory  Complications  None  Antibiotics  2g Ancef    DETAILS OF PROCEDURE:  The patient came to the operating room, was placed in  supine position on the operating room table. General IV anesthesia was given  until a deep plane of anesthesia was obtained. At that point, an  endotracheal tube was placed by anesthesiology service without difficulty. This was a laryngeal nerve monitoring endotracheal tube. Monitoring was  performed throughout the entire procedure to avoid injury to the recurrent  laryngeal nerve. The patient was then prepped and draped in routine fashion. Surgery began with a horizontal incision on the right. This was at the mid  level of the neck. This was carried down into the subcutaneous tissues with  a 15 blade. Bovie cautery was used to cauterize down to the level of the  platysma.   Subplatysmal planes were then elevated superiorly and inferiorly  and a Weitlaner was placed. Fibrofatty tissue was then incised in a vertical  fashion. This allowed palpation of the carotid sheath and the pharynx and  larynx. Kitner dissection was then performed down to the spine. The  pharynx, larynx and esophagus were then medialized with retractors. The  carotid was lateralized with retractors and between the paraspinal muscles  Bovie cautery was provided down to the bone. This was carried superiorly and  inferiorly, superiorly up to the level C4 and inferiorly down to the level  C7. Gentle bleeding from superficial tissues was then cauterized with  bipolar cautery. The wound was thoroughly irrigated with normal saline. Confirmation of interspace C4-5 was confirmed with lateral x-ray. At that  point, the approach was completed and I left the operative field. At that  point, Dr. Leandra Whitehead entered the operative field to perform the rest of the  procedure and will be dictated by Dr. Leandra Whitehead.  At no point during the  approach was there activity on the recurrent laryngeal nerve monitor. I attest that I was present for and performed the entire approach myself.   Electronically signed by Rosmery Morgan MD on 12/29/2021 at 8:40 AM

## 2021-12-29 NOTE — ANESTHESIA POSTPROCEDURE EVALUATION
Department of Anesthesiology  Postprocedure Note    Patient: Horace Gustafson  MRN: [id number]  YOB: 1965  Date of evaluation: 12/29/2021  Time:  2:34 PM     Procedure Summary     Date: 12/29/21 Room / Location: [facility] OR  / [facility]    Anesthesia Start: 7381 Anesthesia Stop: 6851    Procedure: C4-5, C5-6, C6-7 ANTERIOR CERVICAL DISCECTOMY AND FUSION, REMOVAL OF C5-6 PLATE (N/A ) Diagnosis:       Cervical spinal stenosis      Cervical radiculopathy      (Cervical spinal stenosis [M48.02] Cervical radiculopathy [M54.12])    Surgeons: Fara Reilly MD Responsible Provider: Luna Mccann MD    Anesthesia Type: general ASA Status: 2          Anesthesia Type: general    Julito Phase I: Julito Score: 8    Julito Phase II:      Last vitals: Reviewed and per EMR flowsheets.        Anesthesia Post Evaluation    Patient location during evaluation: PACU  Level of consciousness: awake and alert  Airway patency: patent  Nausea & Vomiting: no vomiting  Complications: no  Cardiovascular status: hemodynamically stable  Respiratory status: acceptable  Hydration status: euvolemic

## 2021-12-29 NOTE — PROGRESS NOTES
Patient meets criteria to transfer to floor per Julito Score. There are no available beds at this time. Placed in [place] [address] at 1300. Vitals changed to every hour and Head - to - Toe assessments changed to every 2 hours. Pt states pain is \"tolerable\". Call light given to patient and demonstrated usage. Family ( Radha Zhou) updated.

## 2021-12-29 NOTE — PROGRESS NOTES
Patient admitted to PACU #13 from OR per bed at 1117 s/p C4-5, C5-6, C6-7 ANTERIOR CERVICAL DISCECTOMY AND FUSION, REMOVAL OF C5-6 PLATE (N/A). Report received at bedside in PACU per CRNA. Patient was reported to be hemodynamically stable in OR with no complications. Patient connected to PACU monitoring equipment. IVF's infusing with site unremarkable. Patient arrived to PACU responsive but not fully wakeful from anesthesia with respirations easy, even and regular with no pain noted or verbalized. Right anterior neck surgical dressing with surgical glue remains ALINE with site well approximated and unremarkable. Hemovac drain intact with dressing C,D,I with biopatch and tegaderm with no drainage noted. Bertrand catheter in place and draining. No further changes. Will continue to monitor.

## 2021-12-29 NOTE — H&P
Transportation Needs:     Lack of Transportation (Medical): Not on file    Lack of Transportation (Non-Medical): Not on file   Physical Activity:     Days of Exercise per Week: Not on file    Minutes of Exercise per Session: Not on file   Stress:     Feeling of Stress : Not on file   Social Connections:     Frequency of Communication with Friends and Family: Not on file    Frequency of Social Gatherings with Friends and Family: Not on file    Attends Hoahaoism Services: Not on file    Active Member of [address] [place] [address] or Organizations: Not on file    Attends Club or Organization Meetings: Not on file    Marital Status: Not on file   Intimate Partner Violence:     Fear of Current or Ex-Partner: Not on file    Emotionally Abused: Not on file    Physically Abused: Not on file    Sexually Abused: Not on file   Housing Stability:     Unable to Pay for Housing in the Last Year: Not on file    Number of [place] in the Last Year: Not on file    Unstable Housing in the Last Year: Not on file         Medications Prior to Admission:      Prior to Admission medications    Medication Sig Start Date End Date Taking?  Authorizing Provider   Ginkgo Biloba Extract 60 MG CAPS Take by mouth   Yes Historical Provider, MD   Ascorbic Acid (VITAMIN C) 250 MG tablet Take 250 mg by mouth daily   Yes Historical Provider, MD         Allergies:  Methylergonovine and Zyclara [imiquimod]    PHYSICAL EXAM:      BP (!) 140/80   Pulse 88   Temp 98.7 °F (37.1 °C) (Oral)   Resp 16   Ht 5' 4\" (1.626 m)   Wt 176 lb (79.8 kg)   SpO2 98%   Breastfeeding No   BMI 30.21 kg/m²      Airway:  Airway patent with no audible stridor    Heart:  Regular rate and rhythm, No murmur noted    Lungs:  No increased work of breathing, good air exchange, clear to auscultation bilaterally, no crackles or wheezing    Abdomen:  Soft, non-distended, non-tender, no rebound tenderness or guarding, and no masses palpated    ASSESSMENT AND PLAN     Patient is a 61 y.o. female with above specified procedure planned. 1.  The patients history and physical was obtained and signed off by the pre-admission testing department. Patient seen and focused exam done today- no new changes since last physical exam on 12/17/2021.    2.  Access to ancillary services are available per request of the provider.     ZAHEER Peralta - MAX     12/29/2021

## 2021-12-29 NOTE — BRIEF OP NOTE
Brief Postoperative Note      Patient: Sushil Castellon  YOB: 1965  MRN: [id number]    Date of Procedure: 12/29/2021    Pre-Op Diagnosis: Cervical spinal stenosis [M48.02] Cervical radiculopathy [M54.12]    Post-Op Diagnosis: Same       Procedure(s):  C4-5, C5-6, C6-7 ANTERIOR CERVICAL DISCECTOMY AND FUSION, REMOVAL OF C5-6 PLATE    Surgeon(s):  MD Gerson Roque MD    Assistant:  Physician Assistant: Everette Vee PA-C    Anesthesia: General    Estimated Blood Loss (mL): less than 705     Complications: None    Specimens:   * No specimens in log *    Implants:  See operative note      Drains:   Closed/Suction Drain Anterior;Right Neck Accordion 10 [place] (Active)       Findings: Severe spondylosis above and below previous fusion.      Electronically signed by Gerson Castillo MD on 12/29/2021 at 10:55 AM

## 2021-12-29 NOTE — OP NOTE
Operative Report    PATIENT NAME: Judy Salazar  YOB: 1965  MEDICAL RECORD# [id number]  SURGERY DATE: 12/29/2021    SURGEON:  Alvaro Fitch MD, PhD    Beulah Tellez: Yulissa Sanchez MD, PhD    ASSISTANT SURGEON: Ondina Becerra    PREOPERATIVE DIAGNOSIS:  1. C4-5 and C6-7 degenerative disc disease  2. C4-5 and C6-7 stenosis with radiculopathy  3. Previous C5-6 anterior cervical fusion and fixation    POSTOPERATIVE DIAGNOSIS:  1.  Same. PROCEDURES PERFORMED:  1. Anterior cervical discectomy at C4-5 and C6-7 for decompression of spinal cord and nerve roots  2. Anterior cervical arthrodesis at C4-5 and C6-7  3. Placement of PEEK graft packed with Primagen allograft   4. C4-7 anterior cervical plating with Trinica plate and screws  5. Removal of C5-6 cervical plate  6. Microdissection using the operating microscope. 7.  Intraoperative fluoroscopy  8. Intraoperative neuromonitoring (MEP, SSEP, EMG)    ANESTHESIA:  General    INDICATIONS FOR SURGERY:  Ms. Nicholas Sidhu is a 61year old woman with a history of a C5-6 ACDF in 2006 who presented with severe right-sided radiculopathy and neck pain. MRI demonstrated adjacent level degeneration above and below the construct with corresponding central and foraminal stenosis. Given these findings, the patients symptoms, and her failure to respond to multiple conservative care measures, C4-5 and C6-7 anterior cervical discectomy and fusion was recommended. The patient understands the risks and benefits of the procedure including but not limited to bleeding, infection, worsened neurologic outcome, vascular injury, cerebral spinal fluid leak, pseudoarthrosis, need for additional procedures, fracture of hardware, anesthesia risks, and death. DETAILS OF PROCEDURE:  Under universal protocol and informed consent, the patient was brought to the operating room. General anesthesia was induced and the patient was intubated. Neuromonitoring leads (SSEPs, MEPs, EMG) were placed. After pre-positioning baselines were obtained, the patient was then positioned supine on the operative table with the head placed in gentle extension using a rolled sheet placed under the shoulders. All pressure points were appropriately padded. A horizontal incision in the neck crease was planned using fluoroscopy. Intravenous antibiotic (Ancef) was given by anesthesia. Careful attention was paid to ensure no episodes of hypotension given the patient's cervical pathology. A time out was completed and the surgical site was prepped and draped in the usual sterile fashion. Dr. Marry Dawson began the operation and his part of the case will be detailed in a separate operative note. Briefly, he exposed the cervical spine approaching from the patients right by developing the avascular plane between the sternocleidomastoid and the medial strap muscles. Lateral fluoroscopic imaging was used to confirm proper localization at the C4-7 levels. He also exposed the previous plate at S6-5. Monopolar cautery was used to elevate the longus colli muscles adjacent to the target interspaces and a self-retaining retractor was placed. The previous cervical plate at Z6-5 was then removed. At this time, the draped operating microscope was brought into the field and used for microdissection. The AM-8 attachment on a Appticlesas Federico drill was used to scar down a large ventral osteophytes at the C4-5 level to fully uncover the intervertebral disc. A #15 scalpel was used to incise the disc, and all loose disc material was removed with pituitary rongeurs and curettes. The interspace was further opened up with additional drilling exposing the posterior longitudinal ligament. The posterior longitudinal ligament was opened with a sharp micro nerve hook and removed with a combination of 1mm and 2 mm punches. After undercutting the endplates, a blunt nerve hook was used to confirm the absence of central or foraminal compression at C4-5.  Finally, the endplates were prepared for fusion by drilling away the remainder of cartilaginous endplates down to the subchondral bleeding bone. The wound was then copiously irrigated with antibiotic solution. The self-retaining retractors were then moved to the C6-7 level where the procedure was repeated. The intervertebral disc was uncovered, incised, and removed with a combination of drilling and curettes. The posterior longitudinal ligament was opened with the sharp micro nerve hook. It was then removed with Kerrison punch forceps. Again, attention was paid to ensure complete decompression of the central canal and right foramen. Finally, the endplates were prepared for fusion by drilling away the remainder of cartilaginous endplates down to the subchondral bleeding bone. After sizing with a series of trials at C6-7, a PEEK graft was selected and packed with PrimaGen allograft. It was carefully impacted into the interspace at the midline under fluoroscopic guidance. Next, the process was repeated at C4-5 where a second PEEK graft was selected, packed with PrimaGen allograft, and impacted into the disc space. A 54 mm Trinica plate was chosen and affixed to the ventral C4-7 vertebral bodies with variable screws for stabilization. The screws were locked into position. AP and lateral fluoroscopy confirmed proper position of all instrumentation. The wound was copiously irrigated with antibiotic solution and hemostasis was achieved. A medium hemovac drain was placed overlying the cervical plate and tunneled away from the wound where it was secured to the skin with a 3-0 Nylon suture. The wound was then closed in the usual fashion using running 3-0 Vicryl in the platysma and a running subcuticular 4-0 Monocryl. The wound was then dressed with Dermabond topical skin adhesive. All needle, sponge, and instrument counts were correct. Notably, SSEPs, MEPs, and EMG remained stable for the duration of the case.  The patient was extubated without difficulty and transferred to a hospital bed. She was taken to the PACU in stable condition. I affirm in accordance with CMS regulations that I was present and scrubbed for all critical portions of the case. ESTIMATED BLOOD LOSS: 25 mL    IMPLANTS:   1. Trinica 4.2 x 12 mm variable screws x 8 (C4, C5, C6, and C7)  2. Trinica Select plate, 54 mm  3. [place] S 7o cages:   C4-5: [place] S 7o cage 14 x 11 x 5 mm  C6-7: [place] S 7o cage 14 x 11 x 6 mm  4. 2 cc PrimaGen DBM allograft     SPECIMEN:   1. None    DRAINS:   1. Medium hemovac drain    DISPOSITION:  PACU in stable condition.

## 2021-12-29 NOTE — PROGRESS NOTES
Pt arrived to 5514 from PACU via bed. VSS. A&O x4. Pt has c/o of 3/10 pain in neck/back area. Will give pain medication as ordered. Pt has ice pack on neck/back area. Pt requesting water. Will provide. Pt ordered food tray. No other needs at this time. Fall precautions in place. Call light within reach. Will continue to monitor and handoff to night shift RN.
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
## 2021-12-29 NOTE — PROGRESS NOTES
PACU Transfer to Floor Note    Current Allergies: Methylergonovine and Zyclara [imiquimod]    Pt meets criteria as per Jb Score and ASPAN Standards to transfer to next phase of care. No results for input(s): POCGLU in the last 72 hours. Vitals:    12/29/21 1630   BP: (!) 141/84   Pulse: 83   Resp: 11   Temp: 97.8 °F (36.6 °C)   SpO2: 99%     Vitals within 20% of pt's admission vitals as per JB SCORE    SpO2: 99 %    O2 Flow Rate (L/min): 2 L/min      Intake/Output Summary (Last 24 hours) at 12/29/2021 1657  Last data filed at 12/29/2021 1630  Gross per 24 hour   Intake 2280 ml   Output 2375 ml   Net -95 ml     Surgical incision to anterior neck is closed with glue and open to air. Hemovac drain to surgical site is set to accordian suction (no drainage since 1115 arrival in PACU). Pain assessment:  present - adequately treated    Pain Level: 3    Patient was assessed for alterations to skin integrity. There were no alterations observed. Pt currently has urias catheter. Pt is continent at baseline. Handoff report given at bedside to [facility].   Family ( Jonny Maier) updated and directed to pt room 5514.      12/29/2021 4:57 PM

## 2021-12-30 ENCOUNTER — APPOINTMENT (OUTPATIENT)
Dept: GENERAL RADIOLOGY | Age: 56
End: 2021-12-30
Attending: NEUROLOGICAL SURGERY
Payer: COMMERCIAL

## 2021-12-30 PROCEDURE — 97116 GAIT TRAINING THERAPY: CPT

## 2021-12-30 PROCEDURE — 97535 SELF CARE MNGMENT TRAINING: CPT

## 2021-12-30 PROCEDURE — 6360000002 HC RX W HCPCS: Performed by: NEUROLOGICAL SURGERY

## 2021-12-30 PROCEDURE — G0378 HOSPITAL OBSERVATION PER HR: HCPCS

## 2021-12-30 PROCEDURE — 97530 THERAPEUTIC ACTIVITIES: CPT

## 2021-12-30 PROCEDURE — 6370000000 HC RX 637 (ALT 250 FOR IP): Performed by: NEUROLOGICAL SURGERY

## 2021-12-30 PROCEDURE — 97166 OT EVAL MOD COMPLEX 45 MIN: CPT

## 2021-12-30 PROCEDURE — 96376 TX/PRO/DX INJ SAME DRUG ADON: CPT

## 2021-12-30 PROCEDURE — 96374 THER/PROPH/DIAG INJ IV PUSH: CPT

## 2021-12-30 PROCEDURE — 97162 PT EVAL MOD COMPLEX 30 MIN: CPT

## 2021-12-30 PROCEDURE — 2580000003 HC RX 258: Performed by: NEUROLOGICAL SURGERY

## 2021-12-30 PROCEDURE — 72040 X-RAY EXAM NECK SPINE 2-3 VW: CPT

## 2021-12-30 RX ADMIN — SODIUM CHLORIDE, PRESERVATIVE FREE 10 ML: 5 INJECTION INTRAVENOUS at 08:40

## 2021-12-30 RX ADMIN — OXYCODONE HYDROCHLORIDE 5 MG: 5 TABLET ORAL at 08:39

## 2021-12-30 RX ADMIN — CEFAZOLIN 2000 MG: 10 INJECTION, POWDER, FOR SOLUTION INTRAVENOUS at 00:45

## 2021-12-30 RX ADMIN — METHOCARBAMOL 1000 MG: 100 INJECTION, SOLUTION INTRAMUSCULAR; INTRAVENOUS at 03:00

## 2021-12-30 RX ADMIN — POLYETHYLENE GLYCOL 3350 17 G: 17 POWDER, FOR SOLUTION ORAL at 08:39

## 2021-12-30 RX ADMIN — CEFAZOLIN 2000 MG: 10 INJECTION, POWDER, FOR SOLUTION INTRAVENOUS at 16:21

## 2021-12-30 RX ADMIN — METHOCARBAMOL 750 MG: 750 TABLET ORAL at 20:13

## 2021-12-30 RX ADMIN — OXYCODONE HYDROCHLORIDE 10 MG: 5 TABLET ORAL at 02:59

## 2021-12-30 RX ADMIN — HYDROMORPHONE HYDROCHLORIDE 0.5 MG: 1 INJECTION, SOLUTION INTRAMUSCULAR; INTRAVENOUS; SUBCUTANEOUS at 11:10

## 2021-12-30 RX ADMIN — SODIUM CHLORIDE, PRESERVATIVE FREE 10 ML: 5 INJECTION INTRAVENOUS at 20:15

## 2021-12-30 RX ADMIN — HYDROMORPHONE HYDROCHLORIDE 1 MG: 1 INJECTION, SOLUTION INTRAMUSCULAR; INTRAVENOUS; SUBCUTANEOUS at 00:48

## 2021-12-30 RX ADMIN — HYDROMORPHONE HYDROCHLORIDE 0.5 MG: 1 INJECTION, SOLUTION INTRAMUSCULAR; INTRAVENOUS; SUBCUTANEOUS at 20:13

## 2021-12-30 RX ADMIN — HYDROMORPHONE HYDROCHLORIDE 1 MG: 1 INJECTION, SOLUTION INTRAMUSCULAR; INTRAVENOUS; SUBCUTANEOUS at 06:51

## 2021-12-30 RX ADMIN — SODIUM CHLORIDE: 9 INJECTION, SOLUTION INTRAVENOUS at 23:30

## 2021-12-30 RX ADMIN — OXYCODONE HYDROCHLORIDE 5 MG: 5 TABLET ORAL at 13:58

## 2021-12-30 RX ADMIN — CEFAZOLIN 2000 MG: 10 INJECTION, POWDER, FOR SOLUTION INTRAVENOUS at 08:51

## 2021-12-30 RX ADMIN — OXYCODONE HYDROCHLORIDE 5 MG: 5 TABLET ORAL at 23:20

## 2021-12-30 RX ADMIN — OXYCODONE HYDROCHLORIDE 5 MG: 5 TABLET ORAL at 17:31

## 2021-12-30 ASSESSMENT — PAIN DESCRIPTION - DIRECTION
RADIATING_TOWARDS: SHOULDERS

## 2021-12-30 ASSESSMENT — PAIN DESCRIPTION - ONSET
ONSET: ON-GOING

## 2021-12-30 ASSESSMENT — PAIN DESCRIPTION - PROGRESSION
CLINICAL_PROGRESSION: NOT CHANGED
CLINICAL_PROGRESSION: GRADUALLY WORSENING
CLINICAL_PROGRESSION: NOT CHANGED
CLINICAL_PROGRESSION: NOT CHANGED
CLINICAL_PROGRESSION: GRADUALLY WORSENING
CLINICAL_PROGRESSION: NOT CHANGED

## 2021-12-30 ASSESSMENT — PAIN DESCRIPTION - LOCATION
LOCATION: NECK

## 2021-12-30 ASSESSMENT — PAIN SCALES - GENERAL
PAINLEVEL_OUTOF10: 4
PAINLEVEL_OUTOF10: 4
PAINLEVEL_OUTOF10: 7
PAINLEVEL_OUTOF10: 0
PAINLEVEL_OUTOF10: 3
PAINLEVEL_OUTOF10: 4
PAINLEVEL_OUTOF10: 7
PAINLEVEL_OUTOF10: 4
PAINLEVEL_OUTOF10: 4
PAINLEVEL_OUTOF10: 8
PAINLEVEL_OUTOF10: 0
PAINLEVEL_OUTOF10: 6

## 2021-12-30 ASSESSMENT — PAIN DESCRIPTION - PAIN TYPE
TYPE: ACUTE PAIN;SURGICAL PAIN
TYPE: ACUTE PAIN;SURGICAL PAIN
TYPE: SURGICAL PAIN
TYPE: ACUTE PAIN;SURGICAL PAIN
TYPE: ACUTE PAIN
TYPE: ACUTE PAIN

## 2021-12-30 ASSESSMENT — PAIN DESCRIPTION - FREQUENCY
FREQUENCY: CONTINUOUS

## 2021-12-30 ASSESSMENT — PAIN DESCRIPTION - ORIENTATION
ORIENTATION: POSTERIOR;RIGHT
ORIENTATION: POSTERIOR;RIGHT;ANTERIOR
ORIENTATION: POSTERIOR
ORIENTATION: POSTERIOR;RIGHT

## 2021-12-30 ASSESSMENT — PAIN DESCRIPTION - DESCRIPTORS
DESCRIPTORS: THROBBING;TIGHTNESS
DESCRIPTORS: BURNING;CONSTANT;DISCOMFORT
DESCRIPTORS: THROBBING
DESCRIPTORS: BURNING;CONSTANT;DISCOMFORT
DESCRIPTORS: BURNING;CONSTANT;DISCOMFORT

## 2021-12-30 NOTE — PROGRESS NOTES
4 Eyes Admission Assessment     I agree as the admission nurse that 2 RN's have performed a thorough Head to Toe Skin Assessment on the patient. ALL assessment sites listed below have been assessed on admission. Areas assessed by both nurses:  [x]   Head, Face, and Ears   [x]   Shoulders, Back, and Chest  [x]   Arms, Elbows, and Hands   [x]   Coccyx, Sacrum, and Ischium  [x]   Legs, Feet, and Heels        Does the Patient have Skin Breakdown?   No         Michael Prevention initiated:  No   Wound Care Orders initiated:  No      [facility] nurse consulted for Pressure Injury (Stage 3,4, Unstageable, DTI, NWPT, and Complex wounds) or Michael score 18 or lower:  No      Nurse 1 eSignature: Electronically signed by Ibeth Winston RN on 12/29/21 at 7:13 PM EST    **SHARE this note so that the co-signing nurse is able to place an eSignature**    Nurse 2 eSignature: Electronically signed by Sunny Locke RN on 12/29/21 at 7:14 PM EST

## 2021-12-30 NOTE — PROGRESS NOTES
Dwainedevan Patten to [place]. Notified [facility] of contacting  for Cervical collar; PT was off floor when staff came to adjust. Waiting for them to come back.

## 2021-12-30 NOTE — PLAN OF CARE
Pain - Acute:  Goal: Pain level will decrease  Outcome: Ongoing  Note: Utilizing PO and IV pain medication at this time. Pain ranging from a 4-6/10. Also utilizing ice which is helping. Infection - Surgical Site:  Goal: Will show no infection signs and symptoms  Outcome: Ongoing     Falls - Risk of:  Goal: Will remain free from falls  Outcome: Ongoing  Note: Ambulating fairly well with the assistance of 1 with a walker and GB, contact-guard assistance.
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
## 2021-12-30 NOTE — PLAN OF CARE
Problem: Pain:  Goal: Pain level will decrease  Outcome: Ongoing   Pain treated with PO medication. Problem: Mobility - Impaired:  Goal: Mobility will improve to maximum level  Outcome: Ongoing  Able to move all extremities independently. Problem: Sensory Perception - Impaired:  Goal: Sensory function intact, lower extremity  Outcome: Ongoing  Patient reports full sensation to all extremities.

## 2021-12-30 NOTE — PROGRESS NOTES
Patient a/o x4. Vitals stable. Pain to neck radiates to shoulders, treated with PO and IV medication. Incision c/d/i. hemovac in place with 0mL out overnight. Resting intermittently overnight.

## 2021-12-30 NOTE — PROGRESS NOTES
NEUROSURGERY PROGRESS NOTE    12/30/2021 10:49 AM                               Tami Gitelman                      LOS: 0 days   POD# 1 s/p Procedure(s) (LRB):  C4-5, C5-6, C6-7 ANTERIOR CERVICAL DISCECTOMY AND FUSION, REMOVAL OF C5-6 PLATE (N/A)    Subjective: Patient sitting up in bed upon entering the room. No acute events overnight. Patient c/o trouble with swallowing. Physical Exam:  Patient seen and examined    Vitals:    12/30/21 0648   BP: (!) 144/79   Pulse: 72   Resp: 16   Temp: 98.3 °F (36.8 °C)   SpO2: 98%     GCS:  4 - Opens eyes on own  5 - Alert and oriented  6 - Follows simple motor commands  General: Well developed. Alert and cooperative in no acute distress. HENT: atraumatic, neck supple  Eyes: Optic discs: Not tested  Pulmonary: unlabored respiratory effort  Cardiovascular:  Warm well perfused. No peripheral edema  Gastrointestinal: abdomen soft, NT, ND    Neurological:  Mental Status: Awake, alert, oriented x 4, speech clear and appropriate  Attention: Intact  Language: No aphasia or dysarthria noted  Sensation: Intact to all extremities to light touch  Coordination: Intact    Musculoskeletal:   Gait: Not tested   Assist devices: None   Tone: Normal  Motor strength:    Right  Left    Right  Left    Deltoid  5 5  Hip Flex  5 5   Biceps  5 5  Knee Extensors  5 5   Triceps  5 5  Knee Flexors  5 5   Wrist Ext  5 5  Ankle Dorsiflex. 5 5   Wrist Flex  5 5  Ankle Plantarflex. 5 5   Handgrip  5 5  Ext [place]  5 5   Thumb Ext  5 5         Incision: CDI    Drain: 0 mL in past 24 hours    Radiological Findings:  XR Cervical Spine pending    Labs:  No results for input(s): WBC, HGB, HCT, PLT in the last 72 hours. No results for input(s): NA, K, CL, CO2, BUN, CREATININE, GLUCOSE, CALCIUM, PHOS, MG in the last 72 hours. No results for input(s): PROTIME, INR, APTT in the last 72 hours.     Patient Active Problem List    Diagnosis Date Noted    Cervical spondylosis with radiculopathy 12/29/2021    Cervical spinal stenosis        Assessment:  Patient is a 61 y.o. female s/p Procedure(s) (LRB):  C4-5, C5-6, C6-7 ANTERIOR CERVICAL DISCECTOMY AND FUSION, REMOVAL OF C5-6 PLATE (N/A)    Plan:  1. Neurologically stable  2. Neurologic exams frequency: Q4H  3. For change in exam MUST contact neurosurgery team along with critical care or primary team  4. Supply J collar from [facility] clinic:   - Cervical collar to be worn at all times when out of bed   - Cervical collar does NOT need to be worn when eating, bathing or showering   - Cervical collar pads to be cleaned with soap & water, air dried, and changed daily   - OK to put gauze over incision to keep cervical collar from rubbing, if needed  5. Antibiotics: Cefazolin x 4 doses post op  6. Drain: Remove today  7. Bertrand: DC within 24 hours post-op  8. Bowel Regimen: Glycolax and Senokot-S  9. Pain control: PRN Tylenol, Roxicodone and Dilaudid  10. Muscle spasms: PTN Robaxin  11. Mobility: PT/OT as tolerates  12. Diet: Advance as tolerates  13. DVT Prophylaxis: SCD's & Lovenox  14. Will follow inpatient. Please call with any questions or decline in neurological status    DISPO: DC home today if tolerating swallowing    Patient was seen and examined with Dr. Anais Anderson who agrees with above assessment and plan. Electronically signed by: Stephany Pina, ZAHEER - CNP, ZAHEER-CNP, 12/30/2021 10:49 AM  [phone]    __________________________________________________________________    I saw and examined Kvng Lewis on 12/30/21. I agree with the assessment and plan as detailed above.      Jerry De Jesus MD, PhD  [address], [address], [address] [phone] (c), [phone] (o)

## 2021-12-30 NOTE — PROGRESS NOTES
Hemovac removed. PT tolerated the procedure well; she did not have any complaint of uncomfortable sensation or pain. 10 mL of output noted in the drain.

## 2021-12-30 NOTE — PLAN OF CARE
Problem: Pain:  Goal: Control of acute pain  Description: Control of acute pain  Outcome: Ongoing    Pt given pain medication. Pain controlled per patient. Problem: Falls - Risk of:  Goal: Will remain free from falls  Description: Will remain free from falls  Outcome: Ongoing     Pt remaining free of falls.
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
## 2021-12-30 NOTE — CARE COORDINATION
Case Management Assessment           Initial Evaluation                Date / Time of Evaluation: 12/30/2021 12:08 PM                 Assessment Completed by: Genna Jackson RN     Patient is from home and lives alone but her spouse lives in the apt next door and can assist her at d/c. She also has a lot of family members that can check on her. She denies the need for home care or DME and will have transport to home by family. Patient Name: Amanda Graham     YOB: 1965  Diagnosis: Cervical spinal stenosis [M48.02]  Cervical radiculopathy [M54.12]  Cervical spondylosis with radiculopathy [M47.22]     Date / Time: 12/29/2021  5:12 AM    Patient Admission Status: Inpatient    If patient is discharged prior to next notation, then this note serves as note for discharge by case management. Current PCP: Tomi Min MD  Clinic Patient: No    Chart Reviewed: Yes  Patient/ Family Interviewed: Yes    Initial assessment completed at bedside with: patient    Hospitalization in the last 30 days: No    Emergency Contacts:  Extended Emergency Contact Information  Primary Emergency Contact: Pattie Melchor  [place] Phone: [phone]  Relation: Other  Secondary Emergency Contact: Florence Pace, [address] Phone: [phone]  Relation: Parent    Advance Directives:   Code Status: Full [address]: No  Agent: NA  Contact Number: NA    Financial  Payor: Bijal Bobo / Plan: Bijal Bobo - [place] PPO / Product Type: *No Product type* /     Pre-cert required for SNF: Yes    Pharmacy    [facility] [place] [id number]  Phone: [phone] Fax: [phone]      Potential assistance Purchasing Medications: Potential Assistance Purchasing Medications: No  Does Patient want to participate in local refill/ meds to beds program?:      Meds To Beds General Rules:  1. Can ONLY be done Monday- Friday between 8:30am-5pm  2. Prescription(s) must be in pharmacy by 3pm to be filled same day  3. Copy of patient's insurance/ prescription drug card and patient face sheet must be sent along with the prescription(s)  4. Cost of Rx cannot be added to hospital bill. If financial assistance is needed, please contact unit  or ;  or  CANNOT provide pharmacy voucher for patients co-pays  5. Patients can then  the prescription on their way out of the hospital at discharge, or pharmacy can deliver to the bedside if staff is available. (payment due at time of pick-up or delivery - cash, check, or card accepted)     Able to afford home medications/ co-pay costs: Yes    ADLS  Support Systems: Spouse/Significant Other,Friends/Neighbors    PT AM-PAC: 18 /24  OT AM-PAC:   /24    [place]: apt  Steps: no steps    Plans to RETURN to current housing: Yes  Barriers to RETURNING to current housing: none      DISCHARGE PLAN:  Disposition: Home- No Services Needed    Transportation PLAN for discharge: family     Factors facilitating achievement of predicted outcomes: Family support, Cooperative and Pleasant    Barriers to discharge: Pain and drain in place    Additional Case Management Notes: NA    The Plan for Transition of Care is related to the following treatment goals of Cervical spinal stenosis [M48.02]  Cervical radiculopathy [M54.12]  Cervical spondylosis with radiculopathy [M47.22]    The Patient and/or patient representative [place] and her family were provided with a choice of provider and agrees with the discharge plan Not Indicated    Freedom of choice list was provided with basic dialogue that supports the patient's individualized plan of care/goals and shares the quality data associated with the providers.  Not Indicated    Care Transition patient: Aleyda Fenton RN  [facility]  Case Management Department  Ph: [phone]   Fax: [phone]

## 2021-12-31 VITALS
BODY MASS INDEX: 30.05 KG/M2 | HEIGHT: 64 IN | WEIGHT: 176 LBS | OXYGEN SATURATION: 93 % | RESPIRATION RATE: 16 BRPM | SYSTOLIC BLOOD PRESSURE: 144 MMHG | DIASTOLIC BLOOD PRESSURE: 89 MMHG | TEMPERATURE: 98.1 F | HEART RATE: 103 BPM

## 2021-12-31 PROCEDURE — 6370000000 HC RX 637 (ALT 250 FOR IP): Performed by: NEUROLOGICAL SURGERY

## 2021-12-31 PROCEDURE — 97530 THERAPEUTIC ACTIVITIES: CPT

## 2021-12-31 PROCEDURE — 96376 TX/PRO/DX INJ SAME DRUG ADON: CPT

## 2021-12-31 PROCEDURE — 97116 GAIT TRAINING THERAPY: CPT

## 2021-12-31 PROCEDURE — 6360000002 HC RX W HCPCS: Performed by: PHYSICIAN ASSISTANT

## 2021-12-31 PROCEDURE — G0378 HOSPITAL OBSERVATION PER HR: HCPCS

## 2021-12-31 RX ORDER — SENNA AND DOCUSATE SODIUM 50; 8.6 MG/1; MG/1
1 TABLET, FILM COATED ORAL 2 TIMES DAILY
Qty: 40 TABLET | Refills: 0 | Status: SHIPPED | OUTPATIENT
Start: 2021-12-31

## 2021-12-31 RX ORDER — CYCLOBENZAPRINE HCL 10 MG
10 TABLET ORAL 3 TIMES DAILY PRN
Qty: 21 TABLET | Refills: 0 | Status: SHIPPED | OUTPATIENT
Start: 2021-12-31 | End: 2022-01-10

## 2021-12-31 RX ORDER — DEXAMETHASONE SODIUM PHOSPHATE 4 MG/ML
6 INJECTION, SOLUTION INTRA-ARTICULAR; INTRALESIONAL; INTRAMUSCULAR; INTRAVENOUS; SOFT TISSUE ONCE
Status: COMPLETED | OUTPATIENT
Start: 2021-12-31 | End: 2021-12-31

## 2021-12-31 RX ORDER — OXYCODONE HYDROCHLORIDE 5 MG/1
5 TABLET ORAL EVERY 6 HOURS PRN
Qty: 40 TABLET | Refills: 0 | Status: SHIPPED | OUTPATIENT
Start: 2021-12-31 | End: 2022-01-10

## 2021-12-31 RX ADMIN — DEXAMETHASONE SODIUM PHOSPHATE 6 MG: 4 INJECTION, SOLUTION INTRA-ARTICULAR; INTRALESIONAL; INTRAMUSCULAR; INTRAVENOUS; SOFT TISSUE at 11:25

## 2021-12-31 RX ADMIN — OXYCODONE HYDROCHLORIDE 5 MG: 5 TABLET ORAL at 05:23

## 2021-12-31 RX ADMIN — ACETAMINOPHEN 650 MG: 325 TABLET ORAL at 11:29

## 2021-12-31 ASSESSMENT — PAIN DESCRIPTION - DESCRIPTORS: DESCRIPTORS: BURNING;CONSTANT;DISCOMFORT

## 2021-12-31 ASSESSMENT — PAIN SCALES - GENERAL
PAINLEVEL_OUTOF10: 4
PAINLEVEL_OUTOF10: 2

## 2021-12-31 ASSESSMENT — PAIN DESCRIPTION - ONSET: ONSET: ON-GOING

## 2021-12-31 ASSESSMENT — PAIN DESCRIPTION - FREQUENCY: FREQUENCY: CONTINUOUS

## 2021-12-31 ASSESSMENT — PAIN - FUNCTIONAL ASSESSMENT: PAIN_FUNCTIONAL_ASSESSMENT: ACTIVITIES ARE NOT PREVENTED

## 2021-12-31 ASSESSMENT — PAIN DESCRIPTION - PROGRESSION: CLINICAL_PROGRESSION: NOT CHANGED

## 2021-12-31 ASSESSMENT — PAIN DESCRIPTION - LOCATION: LOCATION: NECK

## 2021-12-31 ASSESSMENT — PAIN DESCRIPTION - PAIN TYPE: TYPE: SURGICAL PAIN

## 2021-12-31 ASSESSMENT — PAIN DESCRIPTION - ORIENTATION: ORIENTATION: POSTERIOR;RIGHT;ANTERIOR

## 2021-12-31 NOTE — PROGRESS NOTES
Mrs. Nicholas Sidhu was discharged with all personal belongings. We reviewed her discharging medications and her high BP and lifestyle factors to change to assist with this. We reviewed general self-care principles. All questions were answered.

## 2021-12-31 NOTE — CARE COORDINATION
Case Management Assessment            Discharge Note                    Date / Time of Note: 12/31/2021 10:53 AM                  Discharge Note Completed by: Ida De Jesus MSW, LSW    Patient Name: Reddy Mccall   YOB: 1965  Diagnosis: Cervical spinal stenosis [M48.02]  Cervical radiculopathy [M54.12]  Cervical spondylosis with radiculopathy [M47.22]   Date / Time: 12/29/2021  5:12 AM    Current PCP: Nelson Lawson MD  Clinic patient: No    Hospitalization in the last 30 days: No    Advance Directives:  Code Status: Full Code  [place] DNR form completed and on chart: No    Financial:  Payor: Elia Mcmahon / Plan: Nat Mcmanus PPO / Product Type: *No Product type* /      Pharmacy:    [address]  [place] [address]  Phone: [phone] Fax: [phone]      Assistance purchasing medications?: Potential Assistance Purchasing Medications: No  Assistance provided by Case Management: None at this time    Does patient want to participate in local refill/ meds to beds program?:      Meds To Beds General Rules:  1. Can ONLY be done Monday- Friday between 8:30am-5pm  2. Prescription(s) must be in pharmacy by 3pm to be filled same day  3. Copy of patient's insurance/ prescription drug card and patient face sheet must be sent along with the prescription(s)  4. Cost of Rx cannot be added to hospital bill. If financial assistance is needed, please contact unit  or ;  or  CANNOT provide pharmacy voucher for patients co-pays  5.  Patients can then  the prescription on their way out of the hospital at discharge, or pharmacy can deliver to the bedside if staff is available. (payment due at time of pick-up or delivery - cash, check, or card accepted)     Able to afford home medications/ co-pay costs: Yes    ADLS:  Current PT AM-PAC Score: 18 /24  Current OT AM-PAC

## 2021-12-31 NOTE — PLAN OF CARE
Infection - Surgical Site:  Goal: Will show no infection signs and symptoms  Outcome: Met This Shift     Pain:  Goal: Control of acute pain  Outcome: Ongoing     Falls - Risk of:  Goal: Will remain free from falls  Outcome: Ongoing     Mobility - Impaired:  Goal: Mobility will improve to maximum level  Outcome: Ongoing

## 2021-12-31 NOTE — PROGRESS NOTES
Neurosurgery Progress Note    2021 10:40 AM                               Kvng Lewis                      LOS: 0 days             POD# 2 s/p Procedure  C4-5, C5-6, C6-7 ANTERIOR CERVICAL DISCECTOMY AND FUSION, REMOVAL OF C5-6 PLATE by Dr. Anais Anderson   Subjective:  No acute events overnight. Patient reports continued discomfort with swallowing but able to eat soft foods. Pt would like to dc home . Arm pain much improved                                                 Physical Exam:    Temp (24hrs), Av.4 °F (36.9 °C), Min:97.5 °F (36.4 °C), Max:99 °F (37.2 °C)      Neuro Exam  The patient is well-appearing and is in no acute distress. Alert and oriented ×4, appropriate, conversant with normal mood and affect. RUE: Deltoids: 5/5, Triceps: 5/5, Biceps: 5/5, WE/WF: 5/5, Finger abd: 5/5, : 5/5   LUE: Deltoids: 5/5, Triceps: 5/5, Biceps: 5/5, WE/WF: 5/5, Finger abd: 5/5, : 5/5  LLE: HE: 5/5, HF: 5/5, KE:5/5, KF: 5/5, PF: 5/5, DF: 5/5  RLE: HE: 5/5, HF: 5/5, KE:5/5, KF: 5/5, PF: 5/5, DF: 5/5    Labs:  No results for input(s): WBC, HGB, HCT, PLT in the last 72 hours. No results for input(s): NA, K, CL, CO2, BUN, CREATININE, GLUCOSE, CALCIUM, PHOS, MG in the last 72 hours. No results for input(s): PROTIME, INR, APTT in the last 72 hours. Assessment and Plan:       Patient is a 61 y.o. female s/p Procedure  C4-5, C5-6, C6-7 ANTERIOR CERVICAL DISCECTOMY AND FUSION, REMOVAL OF C5-6 PLATE (N/A)       1. Neurologically stable  2. ADAT, mobilze  3. DVT prophylaxis, SCDs  4. PT/OT  5. Will give 1 dose of steroids to help with swallowing   6. Will dc home today   7.  Follow up with Dr. Anais Anderson in 10 days     Sumeet Luna PA-C
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
## 2021-12-31 NOTE — DISCHARGE SUMMARY
Physician Discharge Summary     Discharge Summary    Date of Admission: 12/29/2021  5:12 AM  Date of Discharge: 12/31/2021  Admission Diagnosis: 12/31/2021  Patient Active Problem List   Diagnosis    Cervical spinal stenosis    Cervical spondylosis with radiculopathy     Discharge Diagnosis: Same   Condition on Discharge: good  Attending for Admission: Dr. Naty Barakat   Procedures: C4-C7 ACDF    Reason for Admission: Henrry Mccormick is a 61 y.o. female patient who was admitted to the hospital for complaints of Upper extremity pain . She underwent the procedure listed above on 12/29/2021. Hospital Course: After surgery, Her pre-operative arm  pain was imporved She complained of pain with swallowing . The pain was well-controlled on oral medications. Her brace was in place. The dressing was clean, dry and intact. There was no erythema or edema around the surgical site. Prior to discharge She was eating well, urinating and ambulating with a steady gait with PT/OT. Discharge Vitals/Labs: BP (!) 144/89   Pulse 103   Temp 98.1 °F (36.7 °C) (Oral)   Resp 16   Ht 5' 4\" (1.626 m)   Wt 176 lb (79.8 kg)   SpO2 93%   Breastfeeding No   BMI 30.21 kg/m²   CBC: No results found for: WBC, RBC, HGB, HCT, MCV, MCH, MCHC, RDW, PLT, MPV   Discharge Medications:      Medication List      START taking these medications    cyclobenzaprine 10 MG tablet  Commonly known as: FLEXERIL  Take 1 tablet by mouth 3 times daily as needed for Muscle spasms     oxyCODONE 5 MG immediate release tablet  Commonly known as: ROXICODONE  Take 1 tablet by mouth every 6 hours as needed for Pain for up to 10 days.      sennosides-docusate sodium 8.6-50 MG tablet  Commonly known as: SENOKOT-S  Take 1 tablet by mouth 2 times daily        STOP taking these medications    Ginkgo Biloba Extract 60 MG Caps     vitamin C 250 MG tablet           Where to Get Your Medications      You can get these medications from any pharmacy    Bring a paper prescription for each of these medications  · cyclobenzaprine 10 MG tablet  · oxyCODONE 5 MG immediate release tablet  · sennosides-docusate sodium 8.6-50 MG tablet       Discharge Destination: The patient was discharged to Home. Follow-up: The patient is to follow-up with Dr. Cherri Taylor  in the office in 1-2 weeks . Discharge Instructions: Verbal and written discharge instructions as well as dressing change instructions were given to the patient at the time of consent. No NSAIDS or anticoagulation for 1 week post-operatively. No driving or riding in a motor vehicle. No lifting or bending.

## 2024-08-31 ENCOUNTER — RX ONLY (RX ONLY)
Age: 59
End: 2024-08-31

## 2024-09-01 ENCOUNTER — RX ONLY (RX ONLY)
Age: 59
End: 2024-09-01

## 2024-09-05 ENCOUNTER — APPOINTMENT (OUTPATIENT)
Dept: URBAN - METROPOLITAN AREA CLINIC 212 | Age: 59
Setting detail: DERMATOLOGY
End: 2024-09-05

## 2024-09-05 DIAGNOSIS — D49.2 NEOPLASM OF UNSPECIFIED BEHAVIOR OF BONE, SOFT TISSUE, AND SKIN: ICD-10-CM

## 2024-09-05 DIAGNOSIS — R21 RASH AND OTHER NONSPECIFIC SKIN ERUPTION: ICD-10-CM

## 2024-09-05 PROCEDURE — OTHER PRESCRIPTION MEDICATION MANAGEMENT: OTHER

## 2024-09-05 PROCEDURE — OTHER MIPS QUALITY: OTHER

## 2024-09-05 PROCEDURE — OTHER PRESCRIPTION: OTHER

## 2024-09-05 PROCEDURE — OTHER COUNSELING: OTHER

## 2024-09-05 PROCEDURE — OTHER ORDER TESTS: OTHER

## 2024-09-05 PROCEDURE — 99203 OFFICE O/P NEW LOW 30 MIN: CPT | Mod: 25

## 2024-09-05 PROCEDURE — OTHER BIOPSY BY SHAVE METHOD: OTHER

## 2024-09-05 PROCEDURE — 11102 TANGNTL BX SKIN SINGLE LES: CPT

## 2024-09-05 RX ORDER — VALACYCLOVIR HYDROCHLORIDE 1 G/1
TABLET, FILM COATED ORAL
Qty: 14 | Refills: 0 | Status: ERX | COMMUNITY
Start: 2024-09-05

## 2024-09-05 ASSESSMENT — LOCATION SIMPLE DESCRIPTION DERM
LOCATION SIMPLE: CHEST
LOCATION SIMPLE: LEFT ZYGOMA
LOCATION SIMPLE: LEFT TEMPLE

## 2024-09-05 ASSESSMENT — LOCATION DETAILED DESCRIPTION DERM
LOCATION DETAILED: LEFT MEDIAL ZYGOMA
LOCATION DETAILED: RIGHT MEDIAL SUPERIOR CHEST
LOCATION DETAILED: LEFT CENTRAL TEMPLE

## 2024-09-05 ASSESSMENT — LOCATION ZONE DERM
LOCATION ZONE: FACE
LOCATION ZONE: TRUNK

## 2024-09-05 NOTE — PROCEDURE: ORDER TESTS
Bill For Surgical Tray: no
Expected Date Of Service: 09/05/2024
Billing Type: Third-Party Bill
Performing Laboratory: [id number]

## 2024-09-05 NOTE — PROCEDURE: BIOPSY BY SHAVE METHOD
Detail Level: Detailed
Depth Of Biopsy: dermis
Was A Bandage Applied: Yes
Size Of Lesion In Cm: 0.4
X Size Of Lesion In Cm: 0
Biopsy Type: H and E
Biopsy Method: Dermablade
Anesthesia Type: 1% lidocaine with epinephrine
Anesthesia Volume In Cc: 0.5
Hemostasis: Drysol
Wound Care: Petrolatum
Dressing: bandage
Destruction After The Procedure: No
Type Of Destruction Used: Curettage
Curettage Text: The wound bed was treated with curettage after the biopsy was performed.
Cryotherapy Text: The wound bed was treated with cryotherapy after the biopsy was performed.
Electrodesiccation Text: The wound bed was treated with electrodesiccation after the biopsy was performed.
Electrodesiccation And Curettage Text: The wound bed was treated with electrodesiccation and curettage after the biopsy was performed.
Silver Nitrate Text: The wound bed was treated with silver nitrate after the biopsy was performed.
Lab: -[id number]
Consent: Written consent was obtained and risks were reviewed including but not limited to scarring, infection, bleeding, scabbing, incomplete removal, nerve damage and allergy to anesthesia.
Post-Care Instructions: I reviewed with the patient in detail post-care instructions. Patient is to keep the biopsy site dry overnight, and then apply bacitracin twice daily until healed. Patient may apply hydrogen peroxide soaks to remove any crusting.
Notification Instructions: Patient will be notified of biopsy results. However, patient instructed to call the office if not contacted within 2 weeks.
Billing Type: Third-Party Bill
Information: Selecting Yes will display possible errors in your note based on the variables you have selected. This validation is only offered as a suggestion for you. PLEASE NOTE THAT THE VALIDATION TEXT WILL BE REMOVED WHEN YOU FINALIZE YOUR NOTE. IF YOU WANT TO FAX A PRELIMINARY NOTE YOU WILL NEED TO TOGGLE THIS TO 'NO' IF YOU DO NOT WANT IT IN YOUR FAXED NOTE.

## 2024-09-05 NOTE — PROCEDURE: PRESCRIPTION MEDICATION MANAGEMENT
Render In Strict Bullet Format?: Yes
Detail Level: Simple
Plan: Reviewed photo from earlier in the week and significant erythema of left cheek/temple could be consistent with erysipelas or cellulitis. However, report of similar lesions previously, burning/tinging symptoms and possible vesicle concerning for HSV. Denies changes in vision or eye pain.\\n\\n1. Swab obtained today \\n2. Complete course of Keflex previously prescribed\\n3. Start empiric valacyclovir 1g by mouth twice daily for 7 days  \\n4. Seek urgent evaluation at ER/urgent care if any eye irritation or vision changes occur

## (undated) DEVICE — PIN FIX THRD TRINICA SYS STRL

## (undated) DEVICE — CATHETER IV 14GA L5.25IN PERIPH ORNG FEP POLYMER 3 BVL

## (undated) DEVICE — CAUTERY TIP POLISHER: Brand: DEVON

## (undated) DEVICE — SUTURE PERMAHAND SZ 2-0 L12X18IN NONABSORBABLE BLK SILK A185H

## (undated) DEVICE — NEURO FUSION ADD-ON PACK: Brand: MEDLINE INDUSTRIES, INC.

## (undated) DEVICE — SHEET,T,THYROID,STERILE: Brand: MEDLINE

## (undated) DEVICE — SPONGE,PEANUT,XRAY,ST,SM,3/8",5/CARD: Brand: MEDLINE INDUSTRIES, INC.

## (undated) DEVICE — LAMINECTOMY: Brand: MEDLINE INDUSTRIES, INC.

## (undated) DEVICE — BLANKET WRM W40.2XL55.9IN IORT LO BODY + MISTRAL AIR

## (undated) DEVICE — COVER LT HNDL CAM BLU DISP W/ SURG CTRL

## (undated) DEVICE — TOOL T14MH25M LEGEND 14CM 2.5MM MH 2FLT: Brand: MIDAS REX™

## (undated) DEVICE — SOLUTION IV 1000ML 0.9% SOD CHL

## (undated) DEVICE — TOOL T12MH25M LEGEND 12CM 2.5MM MH 2FLT: Brand: MIDAS REX ™

## (undated) DEVICE — SYSTEM SKIN CLOSURE 42CM DERMABOND PRINEO

## (undated) DEVICE — CODMAN® SURGICAL PATTIES 3/4" X 3/4" (1.91CM X 1.91CM): Brand: CODMAN®

## (undated) DEVICE — SUTURE VCRL SZ 3-0 L18IN ABSRB UD L26MM SH 1/2 CIR J864D

## (undated) DEVICE — KIT EVAC 400CC DIA1/8IN H PAT 12.5IN 3 SPR RND SHP PVC DRN

## (undated) DEVICE — AGENT HEMOSTATIC SURGIFLOW MATRIX KIT W/THROMBIN

## (undated) DEVICE — SUTURE MCRYL SZ 4-0 L27IN ABSRB UD L19MM PS-2 1/2 CIR PRIM Y426H

## (undated) DEVICE — CUFF RESTRN WRST OR ANK 45FT AD FOAM

## (undated) DEVICE — NEURO SPONGES: Brand: DEROYAL

## (undated) DEVICE — DRAPE MICSCP W54XL150IN W/ 4 BINOC GLS LENS LEICA

## (undated) DEVICE — UNDERGLOVE SURG SZ 8 BLU LTX FREE SYN POLYISOPRENE POLYMER

## (undated) DEVICE — GLOVE SURG SZ 75 L12IN FNGR THK94MIL TRNSLUC YEL LTX

## (undated) DEVICE — TOWEL,STOP FLAG GOLD N-W: Brand: MEDLINE

## (undated) DEVICE — ADHESIVE SKIN CLSR 0.7ML TOP DERMBND ADV

## (undated) DEVICE — SUTURE ETHLN SZ 3-0 L30IN NONABSORBABLE BLK L36MM FSLX 3/8 1673BH

## (undated) DEVICE — SUTURE VCRL SZ 0 L18IN ABSRB UD L36MM CT-1 1/2 CIR J840D